# Patient Record
Sex: MALE | Race: WHITE
[De-identification: names, ages, dates, MRNs, and addresses within clinical notes are randomized per-mention and may not be internally consistent; named-entity substitution may affect disease eponyms.]

---

## 2018-05-28 ENCOUNTER — HOSPITAL ENCOUNTER (EMERGENCY)
Dept: HOSPITAL 7 - FB.ED | Age: 83
Discharge: HOME | End: 2018-05-28
Payer: MEDICARE

## 2018-05-28 DIAGNOSIS — I50.9: ICD-10-CM

## 2018-05-28 DIAGNOSIS — N18.9: ICD-10-CM

## 2018-05-28 DIAGNOSIS — L03.115: Primary | ICD-10-CM

## 2018-05-28 DIAGNOSIS — I73.9: ICD-10-CM

## 2018-05-28 PROCEDURE — 80053 COMPREHEN METABOLIC PANEL: CPT

## 2018-05-28 PROCEDURE — 86140 C-REACTIVE PROTEIN: CPT

## 2018-05-28 PROCEDURE — 16020 DRESS/DEBRID P-THICK BURN S: CPT

## 2018-05-28 PROCEDURE — 36415 COLL VENOUS BLD VENIPUNCTURE: CPT

## 2018-05-28 PROCEDURE — 99283 EMERGENCY DEPT VISIT LOW MDM: CPT

## 2018-05-28 PROCEDURE — 85025 COMPLETE CBC W/AUTO DIFF WBC: CPT

## 2018-05-28 PROCEDURE — 83880 ASSAY OF NATRIURETIC PEPTIDE: CPT

## 2018-05-29 ENCOUNTER — HOSPITAL ENCOUNTER (INPATIENT)
Dept: HOSPITAL 7 - FB.MS | Age: 83
LOS: 4 days | Discharge: HOME | DRG: 602 | End: 2018-06-02
Attending: FAMILY MEDICINE | Admitting: FAMILY MEDICINE
Payer: MEDICARE

## 2018-05-29 DIAGNOSIS — N40.0: ICD-10-CM

## 2018-05-29 DIAGNOSIS — L03.115: Primary | ICD-10-CM

## 2018-05-29 DIAGNOSIS — I73.9: ICD-10-CM

## 2018-05-29 DIAGNOSIS — I50.23: ICD-10-CM

## 2018-05-29 DIAGNOSIS — Z79.01: ICD-10-CM

## 2018-05-29 DIAGNOSIS — N18.3: ICD-10-CM

## 2018-05-29 DIAGNOSIS — H54.7: ICD-10-CM

## 2018-05-29 DIAGNOSIS — H91.90: ICD-10-CM

## 2018-05-29 DIAGNOSIS — I25.10: ICD-10-CM

## 2018-05-29 DIAGNOSIS — I13.0: ICD-10-CM

## 2018-05-29 DIAGNOSIS — Z86.73: ICD-10-CM

## 2018-05-29 DIAGNOSIS — Z79.82: ICD-10-CM

## 2018-05-29 DIAGNOSIS — G47.33: ICD-10-CM

## 2018-05-29 DIAGNOSIS — E78.5: ICD-10-CM

## 2018-05-29 RX ADMIN — Medication PRN ML: at 19:12

## 2018-05-29 RX ADMIN — Medication PRN ML: at 22:35

## 2018-05-29 RX ADMIN — Medication PRN ML: at 19:46

## 2018-05-29 RX ADMIN — Medication PRN ML: at 19:15

## 2018-05-29 NOTE — PCM.HP
H&P History of Present Illness





- General


Date of Service: 05/29/18


Admit Problem/Dx: 


 Admission Diagnosis/Problem





Admission Diagnosis/Problem      Cellulitis








Source of Information: Patient, Old Records


History Limitations: Reports: No Limitations





- History of Present Illness


Initial Comments - Free Text/Narative: 


Been ordered is an 83-year-old male that came into the clinic to see Dr. Escalante because of fever, chills and redness of both legs. I had seen him 

here at the hospital yesterday treated him for cellulitis, with oral Bactrim.I 

also increased his Lasix dose,due to CHF exacerbation, but his symptoms have 

worsened.


His right leg is worse than the left ,in terms or redness,and swelling. His 

symptoms started insidiously and have progressively gotten worse since 

Saturday. He has a history of severe peripheral vascular disease, CHF ,both 

poorly controlled & chronic kidney disease with a baseline creatinine of 1.5. 

He has stable hypertension. Of note he was recently hospitalized at [facility] because of left acute ischemic MCA stroke. He recovered fairly well 

and is currently on long-term anticoagulation -Eliquis.





- Related Data


Allergies/Adverse Reactions: 


 Allergies











Allergy/AdvReac Type Severity Reaction Status Date / Time


 


No Known Allergies Allergy   Verified 05/29/18 17:46











Home Medications: 


 Home Meds





Acetaminophen [Tylenol] 325 mg PO Q4H PRN 05/28/18 [History]


Apixaban [Eliquis] 2.5 mg PO BID 05/28/18 [History]


Aspirin 81 mg PO DAILY 05/28/18 [History]


Cholecalciferol (Vitamin D3) [Vitamin D3] 2,000 unit PO DAILY 05/28/18 [History]


Docusate Sodium [Colace] 100 mg PO DAILY 05/28/18 [History]


Finasteride [Proscar] 5 mg PO DAILY 05/28/18 [History]


Furosemide [Lasix] 40 mg PO DAILY 05/28/18 [History]


Metoprolol Succinate [Toprol XL] 25 mg PO DAILY 05/28/18 [History]


Multivit-Min/FA/Lycopene/Lut [Centrum Silver Tablet] 1 tab PO DAILY 05/28/18 [

History]


Tamsulosin [Flomax] 0.8 mg PO BEDTIME 05/28/18 [History]


atorvaSTATin [Lipitor] 40 mg PO DAILY 05/28/18 [History]











Past Medical History


HEENT History: Reports: Hard of Hearing, Impaired Vision


Respiratory History: Reports: SOB


Neurological History: Reports: CVA


Other Neuro History: April 22, 2018-stroke





Social & Family History





- Family History


Family Medical History: Unobtainable





- Tobacco Use


Smoking Status *Q: Never Smoker


Second Hand Smoke Exposure: No





- Caffeine Use


Caffeine Use: Reports: Coffee





- Recreational Drug Use


Recreational Drug Use: No





H&P Review of Systems





- Review of Systems:


Review Of Systems: ROS reveals no pertinent complaints other than HPI.





Exam





- Exam


Exam: See Below





- Vital Signs


Weight: 91.172 kg





- Exam


Quality Assessment: No: Supplemental Oxygen


General: Alert, Oriented, 4


HEENT: PERRLA, Hearing Intact, Mucosa Moist & Pink, Nares Patent, Normal Nasal 

Septum, Posterior Pharynx Clear, Conjunctiva Clear, EOMI, EACs Clear, TMs Clear


Neck: Supple, Trachea Midline, 2


Lungs: Crackles, Rales


Cardiovascular: Regular Rate, Regular Rhythm


GI/Abdominal Exam: Normal Bowel Sounds, Soft, Non-Tender, No Organomegaly, No 

Distention, No Abnormal Bruit, No Mass, Pelvis Stable


 (Male) Exam: Deferred


Rectal (Males) Exam: Deferred


Back Exam: Normal Inspection, Full Range of Motion, NT


Extremities: Pedal Edema, Increased Warmth, Mottled, Redness


Skin: Warm, Dry, Intact


Neurological: Cranial Nerves Intact, Reflexes Equal Bilateral


Neuro Extensive - Mental Status: Alert, Oriented x3, Normal Mood/Affect, Normal 

Cognition


Neuro Extensive - Motor, Sensory, Reflexes: CN II-XII Intact, Normal Gait, 

Normal Reflexes


Psychiatric: Alert, Normal Affect, Normal Mood





- Problem List


(1) Cellulitis


SNOMED Code(s): 504534331


   ICD Code: L03.90 - CELLULITIS, UNSPECIFIED   Status: Acute   Current Visit: 

Yes   


Qualifiers: 


   Site of cellulitis: extremity   Laterality: unspecified laterality 





(2) HTN (hypertension)


SNOMED Code(s): 67604441


   ICD Code: I10 - ESSENTIAL (PRIMARY) HYPERTENSION   Status: Acute   Current 

Visit: Yes   


Qualifiers: 


   Hypertension type: essential hypertension   Qualified Code(s): I10 - 

Essential (primary) hypertension   





(3) CHF (congestive heart failure)


SNOMED Code(s): 21041538


   ICD Code: I50.9 - HEART FAILURE, UNSPECIFIED   Status: Acute   Current Visit

: Yes   


Qualifiers: 


   Heart failure type: systolic 





(4) CKD (chronic kidney disease)


SNOMED Code(s): 467481214


   ICD Code: N18.9 - CHRONIC KIDNEY DISEASE, UNSPECIFIED   Status: Chronic   

Current Visit: Yes   


Qualifiers: 


   Chronic kidney disease stage: stage 3 (moderate)   Qualified Code(s): N18.3 

- Chronic kidney disease, stage 3 (moderate)   





(5) PAD (peripheral artery disease)


SNOMED Code(s): 245358260, 579373054


   ICD Code: I73.9 - PERIPHERAL VASCULAR DISEASE, UNSPECIFIED   Status: Chronic

   Current Visit: Yes   





(6) H/O: CVA (cerebrovascular accident)


SNOMED Code(s): 154052513


   ICD Code: Z86.73 - PRSNL HX OF TIA (TIA), AND CEREB INFRC W/O RESID DEFICITS

   Status: Acute   Current Visit: Yes   





(7) Afib


SNOMED Code(s): 27584961


   ICD Code: I48.91 - UNSPECIFIED ATRIAL FIBRILLATION   Status: Acute   Current 

Visit: Yes   


Qualifiers: 


   Atrial fibrillation type: persistent   Qualified Code(s): I48.1 - Persistent 

atrial fibrillation   





(8) CAD (coronary artery disease)


SNOMED Code(s): 78503363


   ICD Code: I25.10 - ATHSCL HEART DISEASE OF NATIVE CORONARY ARTERY W/O ANG 

PCTRS   Status: Chronic   Current Visit: Yes   


Qualifiers: 


   Coronary Disease-Associated Artery/Lesion type: bypass graft 





(9) HLD (hyperlipidemia)


SNOMED Code(s): 97519693


   ICD Code: E78.5 - HYPERLIPIDEMIA, UNSPECIFIED   Status: Chronic   Current 

Visit: Yes   


Qualifiers: 


   Hyperlipidemia type: unspecified   Qualified Code(s): E78.5 - Hyperlipidemia

, unspecified   





(10) ANNABELLA (obstructive sleep apnea)


SNOMED Code(s): 90235504


   ICD Code: G47.33 - OBSTRUCTIVE SLEEP APNEA (ADULT) (PEDIATRIC)   Status: 

Acute   Current Visit: Yes   





(11) Anticoagulant long-term use


SNOMED Code(s): 871072408


   ICD Code: Z79.01 - LONG TERM (CURRENT) USE OF ANTICOAGULANTS   Status: Acute

   Current Visit: Yes   





(12) BPH (benign prostatic hyperplasia)


SNOMED Code(s): 135534748


   ICD Code: N40.0 - BENIGN PROSTATIC HYPERPLASIA WITHOUT LOWER URINRY TRACT 

SYMP   Status: Acute   Current Visit: Yes   


Qualifiers: 


   Lower urinary tract symptom presence: symptoms present   Lower urinary tract 

symptom detail: unspecified   Qualified Code(s): N40.1 - Benign prostatic 

hyperplasia with lower urinary tract symptoms   


Problem List Initiated/Reviewed/Updated: Yes


Orders Last 24hrs: 


 Active Orders 24 hr











 Category Date Time Status


 


 Patient Status [ADT] Routine ADT  05/29/18 18:00 Ordered


 


 EKG Documentation Completion [RC] ASDIRECTED Care  05/29/18 18:02 Ordered


 


 Height and Weight [RC] DAILY Care  05/29/18 17:59 Ordered


 


 Intake and Output [RC] QSHIFT Care  05/29/18 18:00 Ordered


 


 Oxygen Therapy [RC] PRN Care  05/29/18 18:00 Ordered


 


 Up to Chair [RC] ASDIRECTED Care  05/29/18 17:59 Ordered


 


 VTE/DVT Education [RC] Per Unit Routine Care  05/29/18 18:00 Ordered


 


 Vital Signs [RC] Q4H Care  05/29/18 18:00 Ordered


 


 Heart Healthy Diet [DIET] Diet  05/29/18 Breakfast Ordered


 


 Chest 2V [CR] Stat Exams  05/29/18 17:59 Ordered


 


 BASIC METABOLIC PANEL,BMP [CHEM] AM Lab  05/30/18 05:11 Ordered


 


 BASIC METABOLIC PANEL,BMP [CHEM] Stat Lab  05/29/18 17:59 Ordered


 


 CBC WITH AUTO DIFF [HEME] AM Lab  05/30/18 05:11 Ordered


 


 CBC WITH AUTO DIFF [HEME] Stat Lab  05/29/18 17:59 Ordered


 


 CULTURE BLOOD [BC] Urgent Lab  05/29/18 18:02 Ordered


 


 CULTURE BLOOD [BC] Urgent Lab  05/29/18 18:02 Ordered


 


 PRO B-TYPE NATRIUR PEPT,BNPPRO [CHEM] DAILY Lab  05/30/18 05:11 Ordered


 


 PRO B-TYPE NATRIUR PEPT,BNPPRO [CHEM] DAILY Lab  05/31/18 05:11 Ordered


 


 PRO B-TYPE NATRIUR PEPT,BNPPRO [CHEM] DAILY Lab  06/01/18 05:11 Ordered


 


 Acetaminophen [Tylenol] Med  05/29/18 17:59 Ordered





 650 mg PO Q4H PRN   


 


 Ondansetron [Zofran ODT] Med  05/29/18 17:59 Ordered





 4 mg PO Q4H PRN   


 


 Sodium Chloride 0.9% [Saline Flush] Med  05/29/18 17:59 Ordered





 10 ml FLUSH ASDIRECTED PRN   


 


 Vancomycin 1,000 mg Med  05/29/18 18:00 Ordered





 Sodium Chloride 0.9% [Normal Saline] 250 ml   





 IV Q12H   


 


 ceFAZolin [Ancef] 2 gm Med  05/29/18 18:00 Ordered





 Premix Bag 1 bag   





 IV Q8H   


 


 Blood Culture x2 Reflex Set [OM.PC] Urgent Oth  05/29/18 17:59 Ordered


 


 Peripheral IV Insertion Adult [OM.PC] Routine Oth  05/29/18 17:59 Ordered


 


 Resuscitation Status Routine Resus Stat  05/29/18 17:59 Ordered


 


 EKG 12 Lead [EK] Stat Ther  05/29/18 17:59 Ordered








 Medication Orders





Acetaminophen (Tylenol)  650 mg PO Q4H PRN


   PRN Reason: Pain (Mild 1-3)/fever


Cefazolin Sodium/Dextrose 2 gm (/ Premix)  50 mls @ 100 mls/hr IV Q8H LD


Vancomycin HCl 1,000 mg/ (Sodium Chloride)  250 mls @ 167 mls/hr IV Q12H LD


Ondansetron HCl (Zofran Odt)  4 mg PO Q4H PRN


   PRN Reason: nausea, able to take PO


Sodium Chloride (Saline Flush)  10 ml FLUSH ASDIRECTED PRN


   PRN Reason: Keep Vein Open








Assessment/Plan Comment:: 


We'll admit Mr. Javed for IV antibiotic therapy. Before initiation, of 

ordered for 2 sets of blood cultures and a repeat blood count. I will start 

with Ancef and vancomycin(to be dosed by pharmacy) to cover for possibility of 

MRSA. Also give him IV diuresis,with Lasix. The rest of his home medications 

will be continued as appropriate.

## 2018-05-29 NOTE — ER
DATE SEEN:  05/28/2018

 

REASON FOR VISIT:  Swelling of the leg.

 

HISTORY OF PRESENT ILLNESS:  This is an 83-year-old male with swelling of the

right leg for the last week or so.  The swelling has been associated with

redness and weeping wounds.  This morning, he developed chills and felt hot.

 

REVIEW OF SYSTEMS:  He has chronic shortness of breath.  He denies headache or

chest pain.

 

PAST MEDICAL HISTORY:  CHF, he had a stroke in April of 2018, chronic renal

failure with a creatinine of 1.6 baseline, BPH, CAD, and peripheral vascular

disease.

 

ALLERGIES:  None.

 

MEDICATIONS:  Medications were printed from the [facility] [facility], and they are noted

in the electronic record.

 

PHYSICAL EXAMINATION:  GENERAL:  He is not in any cardiopulmonary distress.

VITAL SIGNS:  He has a normal temperature, pulse of 78, blood pressure is

normal, and oxygenation 91% on room air.  ENT:  Normal.  Neck:  No JVD.  CHEST:

End-expiratory rhonchi.  CARDIOVASCULAR:  Regularly irregular rhythm.  No

murmurs.  EXTREMITIES:  Mild-to-moderate peripheral edema.  The right leg has

redness, tenderness, and blisters and is slightly warm.

 

LABORATORIES:  White cell count 12.2.  CRP is 3.3.  Creatinine is 2.1.  BNP is

pending.

 

IMPRESSION:

1. Cellulitis, right lower extremity.

2. Congestive heart failure.

3. Peripheral vascular disease.

 

PLAN:

1. The patient is already on Eliquis and Lasix.  I increased the Lasix to 40

    mg b.i.d.

2. I started Bactrim DS 1 tablet b.i.d.

3. I recommended elevation of the legs, compression with wrapping, and a

    followup to be arranged with Dr. Escalante tomorrow.

 

TIME SEEN:  1450 hours.

 

Job#: [id number]

DD: 05/28/2018 1453

DT: 05/29/2018 0054 TN/MODL

## 2018-05-30 RX ADMIN — Medication PRN ML: at 03:01

## 2018-05-30 RX ADMIN — Medication PRN ML: at 14:18

## 2018-05-30 RX ADMIN — Medication PRN ML: at 22:49

## 2018-05-30 RX ADMIN — Medication PRN ML: at 09:50

## 2018-05-30 RX ADMIN — VANCOMYCIN HYDROCHLORIDE SCH MLS/HR: 500 INJECTION, POWDER, LYOPHILIZED, FOR SOLUTION INTRAVENOUS at 20:58

## 2018-05-30 RX ADMIN — Medication PRN ML: at 11:05

## 2018-05-30 RX ADMIN — Medication PRN ML: at 21:33

## 2018-05-30 RX ADMIN — Medication PRN ML: at 21:34

## 2018-05-30 RX ADMIN — VITAMIN D, TAB 1000IU (100/BT) SCH UNITS: 25 TAB at 09:36

## 2018-05-30 RX ADMIN — Medication PRN ML: at 21:08

## 2018-05-30 NOTE — PCM.PN
- General Info


Date of Service: 05/30/18


Subjective Update: 


Mr. Javed slept well overnight. He has no fever or chills neither does he 

have any chest pain. He is intermittently short of breath. The leg swelling and 

redness persists.





- Review of Systems


HEENT: Reports: No Symptoms


Gastrointestinal: Reports: No Symptoms


Genitourinary: Reports: No Symptoms





- Patient Data


Vitals - Most Recent: 


 Last Vital Signs











Temp  97.6 F   05/30/18 02:00


 


Pulse  52 L  05/30/18 02:00


 


Resp  18   05/30/18 02:00


 


BP  127/68   05/30/18 02:00


 


Pulse Ox  96   05/30/18 02:00











Weight - Most Recent: 90.718 kg


I&O - Last 24 Hours: 


 Intake & Output











 05/29/18 05/30/18 05/30/18





 22:59 06:59 14:59


 


Intake Total 700 200 


 


Output Total  450 


 


Balance 700 -250 











Lab Results Last 24 Hours: 


 Laboratory Results - last 24 hr











  05/29/18 05/29/18 05/30/18 Range/Units





  18:25 18:25 06:34 


 


WBC  11.8   10.7  (4.5-12.0)  X10-3/uL


 


RBC  4.47   4.40  (4.30-5.75)  x10(6)uL


 


Hgb  14.6   14.4  (11.5-15.5)  g/dL


 


Hct  43.3   43.4  (30.0-51.3)  %


 


MCV  96.8 H   98.7 H  (80-96)  fL


 


MCH  32.6   32.8  (27.7-33.6)  pg


 


MCHC  33.6   33.2  (32.2-35.4)  g/dL


 


RDW  14.0   14.1  (11.5-15.5)  %


 


Plt Count  88 L   90 L  (125-369)  X10(3)uL


 


MPV  11.0 H   11.3 H  (7.4-10.4)  fL


 


Neut % (Auto)  84.8 H    (46-82)  %


 


Lymph % (Auto)  5.7 L    (13-37)  %


 


Mono % (Auto)  8.8    (4-12)  %


 


Eos % (Auto)  0 L    (1.0-5.0)  %


 


Baso % (Auto)  0    (0-2)  %


 


Neut # (Auto)  10.1 H    (1.6-8.3)  #


 


Lymph # (Auto)  0.7    (0.6-5.0)  #


 


Mono # (Auto)  1.0    (0.0-1.3)  #


 


Eos # (Auto)  0.0    (0.0-0.8)  #


 


Baso # (Auto)  0.0    (0.0-0.2)  #


 


Add Manual Diff    Yes  


 


Neutrophils % (Manual)    89 H  (46-82)  %


 


Lymphocytes % (Manual)    5 L  (13-37)  %


 


Monocytes % (Manual)    6  (4-12)  %


 


Polychromasia      


 


Sodium   139   (135-145)  mmol/L


 


Potassium   3.5   (3.5-5.3)  mmol/L


 


Chloride   100   (100-110)  mmol/L


 


Carbon Dioxide   29   (21-32)  mmol/L


 


BUN   50 H   (7-18)  mg/dL


 


Creatinine   2.2 H*   (0.70-1.30)  mg/dL


 


Est Cr Clr Drug Dosing   30.10   mL/min


 


Estimated GFR (MDRD)   29 L   (>60)  


 


BUN/Creatinine Ratio   22.7 H   (9-20)  


 


Glucose   108   ()  mg/dL


 


Calcium   9.4   (8.6-10.2)  mg/dL


 


NT-Pro-B Natriuret Pep     (<=450)  pg/mL














  05/30/18 05/30/18 Range/Units





  06:34 06:34 


 


WBC    (4.5-12.0)  X10-3/uL


 


RBC    (4.30-5.75)  x10(6)uL


 


Hgb    (11.5-15.5)  g/dL


 


Hct    (30.0-51.3)  %


 


MCV    (80-96)  fL


 


MCH    (27.7-33.6)  pg


 


MCHC    (32.2-35.4)  g/dL


 


RDW    (11.5-15.5)  %


 


Plt Count    (125-369)  X10(3)uL


 


MPV    (7.4-10.4)  fL


 


Neut % (Auto)    (46-82)  %


 


Lymph % (Auto)    (13-37)  %


 


Mono % (Auto)    (4-12)  %


 


Eos % (Auto)    (1.0-5.0)  %


 


Baso % (Auto)    (0-2)  %


 


Neut # (Auto)    (1.6-8.3)  #


 


Lymph # (Auto)    (0.6-5.0)  #


 


Mono # (Auto)    (0.0-1.3)  #


 


Eos # (Auto)    (0.0-0.8)  #


 


Baso # (Auto)    (0.0-0.2)  #


 


Add Manual Diff    


 


Neutrophils % (Manual)    (46-82)  %


 


Lymphocytes % (Manual)    (13-37)  %


 


Monocytes % (Manual)    (4-12)  %


 


Polychromasia    


 


Sodium  140   (135-145)  mmol/L


 


Potassium  3.5   (3.5-5.3)  mmol/L


 


Chloride  101   (100-110)  mmol/L


 


Carbon Dioxide  29   (21-32)  mmol/L


 


BUN  50 H   (7-18)  mg/dL


 


Creatinine  2.3 H*   (0.70-1.30)  mg/dL


 


Est Cr Clr Drug Dosing  28.79   mL/min


 


Estimated GFR (MDRD)  27 L   (>60)  


 


BUN/Creatinine Ratio  21.7 H   (9-20)  


 


Glucose  100   ()  mg/dL


 


Calcium  9.1   (8.6-10.2)  mg/dL


 


NT-Pro-B Natriuret Pep   40945 H*  (<=450)  pg/mL











Med Orders - Current: 


 Current Medications





Acetaminophen (Tylenol)  650 mg PO Q4H PRN


   PRN Reason: Pain (Mild 1-3)/fever


   Last Admin: 05/29/18 20:55 Dose:  650 mg


Apixaban (Eliquis)  2.5 mg PO BID LD


   Last Admin: 05/29/18 22:28 Dose:  2.5 mg


Furosemide (Lasix)  40 mg IVPUSH BIDDIURETIC [facility]


   Last Admin: 05/29/18 19:08 Dose:  40 mg


Cefazolin Sodium/Dextrose 2 gm (/ Premix)  50 mls @ 100 mls/hr IV Q8H [facility]


   Last Admin: 05/30/18 02:28 Dose:  100 mls/hr


Vancomycin HCl 1,000 mg/ (Sodium Chloride)  250 mls @ 167 mls/hr IV Q12H [facility]


Vancomycin HCl 1,000 mg/Vancomycin HCl 500 mg/ Sodium Chloride  500 mls @ 

333.333 mls/hr IV Q36H [facility]


Metolazone (Zaroxolyn)  5 mg PO ONETIME ONE


   Stop: 05/30/18 08:07


Ondansetron HCl (Zofran Odt)  4 mg PO Q4H PRN


   PRN Reason: nausea, able to take PO


Sodium Chloride (Saline Flush)  10 ml FLUSH ASDIRECTED PRN


   PRN Reason: Keep Vein Open


   Last Admin: 05/30/18 03:01 Dose:  10 ml


Tamsulosin HCl (Flomax)  0.8 mg PO BEDTIME LD





Discontinued Medications





Vancomycin HCl 1,000 mg/Vancomycin HCl 500 mg/ Sodium Chloride  500 mls @ 

333.333 mls/hr IV ONETIME ONE


   Stop: 05/29/18 20:29


   Last Admin: 05/29/18 20:45 Dose:  333.333 mls/hr











- Exam


Quality Assessment: No: Supplemental Oxygen


General: Alert, Oriented


HEENT: Pupils Equal


Lungs: Clear to Auscultation, Rales


Cardiovascular: Irregular Rhythm


GI/Abdominal Exam: Normal Bowel Sounds, Soft, Non-Tender, No Organomegaly, No 

Distention, No Abnormal Bruit, No Mass, Pelvis Stable


Extremities: Pedal Edema, Slow Capillary Refill, Leg Pain, Mottled, Pallor, 

Redness


Psy/Mental Status: Alert, Normal Affect, Normal Mood





- Problem List & Annotations


(1) Cellulitis


SNOMED Code(s): 052453492


   Code(s): L03.90 - CELLULITIS, UNSPECIFIED   Status: Acute   Current Visit: 

Yes   


Qualifiers: 


   Site of cellulitis: extremity   Laterality: unspecified laterality 





(2) HTN (hypertension)


SNOMED Code(s): 26848161


   Code(s): I10 - ESSENTIAL (PRIMARY) HYPERTENSION   Status: Acute   Current 

Visit: Yes   


Qualifiers: 


   Hypertension type: essential hypertension   Qualified Code(s): I10 - 

Essential (primary) hypertension   





(3) CHF (congestive heart failure)


SNOMED Code(s): 95210953


   Code(s): I50.9 - HEART FAILURE, UNSPECIFIED   Status: Acute   Current Visit: 

Yes   


Qualifiers: 


   Heart failure type: systolic 





(4) CKD (chronic kidney disease)


SNOMED Code(s): 980359275


   Code(s): N18.9 - CHRONIC KIDNEY DISEASE, UNSPECIFIED   Status: Chronic   

Current Visit: Yes   


Qualifiers: 


   Chronic kidney disease stage: stage 3 (moderate)   Qualified Code(s): N18.3 

- Chronic kidney disease, stage 3 (moderate)   





(5) PAD (peripheral artery disease)


SNOMED Code(s): 999950608, 548133000


   Code(s): I73.9 - PERIPHERAL VASCULAR DISEASE, UNSPECIFIED   Status: Chronic 

  Current Visit: Yes   





(6) H/O: CVA (cerebrovascular accident)


SNOMED Code(s): 020749075


   Code(s): Z86.73 - PRSNL HX OF TIA (TIA), AND CEREB INFRC W/O RESID DEFICITS 

  Status: Acute   Current Visit: Yes   





(7) Afib


SNOMED Code(s): 71025511


   Code(s): I48.91 - UNSPECIFIED ATRIAL FIBRILLATION   Status: Acute   Current 

Visit: Yes   


Qualifiers: 


   Atrial fibrillation type: persistent   Qualified Code(s): I48.1 - Persistent 

atrial fibrillation   





(8) CAD (coronary artery disease)


SNOMED Code(s): 39303969


   Code(s): I25.10 - ATHSCL HEART DISEASE OF NATIVE CORONARY ARTERY W/O ANG 

PCTRS   Status: Chronic   Current Visit: Yes   


Qualifiers: 


   Coronary Disease-Associated Artery/Lesion type: bypass graft 





(9) HLD (hyperlipidemia)


SNOMED Code(s): 71281996


   Code(s): E78.5 - HYPERLIPIDEMIA, UNSPECIFIED   Status: Chronic   Current 

Visit: Yes   


Qualifiers: 


   Hyperlipidemia type: unspecified   Qualified Code(s): E78.5 - Hyperlipidemia

, unspecified   





(10) ANNABELLA (obstructive sleep apnea)


SNOMED Code(s): 88334649


   Code(s): G47.33 - OBSTRUCTIVE SLEEP APNEA (ADULT) (PEDIATRIC)   Status: 

Acute   Current Visit: Yes   





(11) Anticoagulant long-term use


SNOMED Code(s): 405874321


   Code(s): Z79.01 - LONG TERM (CURRENT) USE OF ANTICOAGULANTS   Status: Acute 

  Current Visit: Yes   





(12) BPH (benign prostatic hyperplasia)


SNOMED Code(s): 339185963


   Code(s): N40.0 - BENIGN PROSTATIC HYPERPLASIA WITHOUT LOWER URINRY TRACT 

SYMP   Status: Acute   Current Visit: Yes   


Qualifiers: 


   Lower urinary tract symptom presence: symptoms present   Lower urinary tract 

symptom detail: unspecified   Qualified Code(s): N40.1 - Benign prostatic 

hyperplasia with lower urinary tract symptoms   





- Problem List Review


Problem List Initiated/Reviewed/Updated: Yes





- My Orders


Last 24 Hours: 


My Active Orders





05/29/18 17:59


Height and Weight [RC] 07 


Up to Chair [RC] 09,13,17,21 


Chest 2V [CR] Stat 


Acetaminophen [Tylenol]   650 mg PO Q4H PRN 


Ondansetron [Zofran ODT]   4 mg PO Q4H PRN 


Sodium Chloride 0.9% [Saline Flush]   10 ml FLUSH ASDIRECTED PRN 


Blood Culture x2 Reflex Set [OM.PC] Urgent 


Peripheral IV Insertion Adult [OM.PC] Routine 


Resuscitation Status Routine 


EKG 12 Lead [EK] Stat 





05/29/18 18:00


Patient Status [ADT] Routine 


Intake and Output [RC] 06,14,22 


Oxygen Therapy [RC] PRN 


VTE/DVT Education [RC] Per Unit Routine 


Vital Signs [RC] 04,08,12,16,20,00 


Vancomycin 1,000 mg   Sodium Chloride 0.9% [Normal Saline] 250 ml IV Q12H 


ceFAZolin [Ancef] 2 gm   Premix Bag 1 bag IV Q8H 





05/29/18 18:02


EKG Documentation Completion [RC] ASDIRECTED 





05/29/18 18:25


CULTURE BLOOD [BC] Urgent 





05/29/18 18:30


Furosemide [Lasix]   40 mg IVPUSH BIDDIURETIC 





05/29/18 18:35


CULTURE BLOOD [BC] Urgent 





05/29/18 21:45


Apixaban [Eliquis]   2.5 mg PO BID 





05/30/18 08:06


Metolazone [Zaroxolyn]   5 mg PO ONETIME ONE 





05/30/18 21:00


Tamsulosin [Flomax]   0.8 mg PO BEDTIME 





05/31/18 05:11


BASIC METABOLIC PANEL,BMP [CHEM] AM 


CBC WITH AUTO DIFF [HEME] AM 


PRO B-TYPE NATRIUR PEPT,BNPPRO [CHEM] DAILY 





05/31/18 07:00


Vancomycin 1,000 mg Vancomycin 500 mg   Sodium Chloride 0.9% [Normal Saline] 

500 ml IV Q36H 





06/01/18 05:11


PRO B-TYPE NATRIUR PEPT,BNPPRO [CHEM] DAILY 














- Plan


Plan:: 


BNP is markedly elevated. I will continued IV Lasix and add one-time dose of 

Zaroxolyn. His Echo from April showes global hypokinesis and EF of 20%.I'll 

continue this current treatment of vancomycin dosed by pharmacy, and Ancef 

every 8 hours. I did recommend elevation of the extremities, and will plan to 

follow basic profile CBC and BNP in the morning.

## 2018-05-30 NOTE — CR
INDICATION:  Short of breath.



CHEST:  PA and lateral views of the chest were obtained 5-29-18.  No comparison 
was available.



Bibasilar pleural parenchymal changes are noted likely representing pneumonia 
and pleuritis.  However the upper lung pulmonary vasculature is somewhat 
prominent and the heart is enlarged with mitral valve replacement-median 
sternotomy.  The possibility of CHF is also a consideration.



Infiltration also appears to be minimal in the right upper lobe with pleuritis, 
there should be some thickening of the minor fissure.



Bridging hyperostotic changes noted in the mid to lower thoracic spine.



Hyperaeration, prominent PA diameter, and flattened diaphragm leaves suggest 
COPD.



IMPRESSION:  

1.   ASHD, cardiomegaly, mitral valve replacement with possible CHF.

2.   Bibasilar pleural parenchymal changes made on the basis of pneumonia and 
pleuritis-correlate clinically.

3.   Probable COPD.

4.   DJD spine.

5.   Probable minimal patchy pneumonia and pleuritis in the right upper lobe.





MTDD

## 2018-05-31 RX ADMIN — Medication PRN ML: at 22:26

## 2018-05-31 RX ADMIN — POTASSIUM CHLORIDE SCH MEQ: 1500 TABLET, EXTENDED RELEASE ORAL at 10:41

## 2018-05-31 RX ADMIN — Medication PRN ML: at 22:06

## 2018-05-31 RX ADMIN — VANCOMYCIN HYDROCHLORIDE SCH MLS/HR: 500 INJECTION, POWDER, LYOPHILIZED, FOR SOLUTION INTRAVENOUS at 20:29

## 2018-05-31 RX ADMIN — VITAMIN D, TAB 1000IU (100/BT) SCH UNITS: 25 TAB at 08:18

## 2018-05-31 RX ADMIN — Medication PRN ML: at 08:22

## 2018-05-31 RX ADMIN — Medication PRN ML: at 10:30

## 2018-05-31 NOTE — PCM.PN
- General Info


Date of Service: 05/31/18


Subjective Update: 


Then has had significant diuresis. Pain and swelling of both legs is improved. 

Shortness of breath has also improved. He has normal fever chills





- Review of Systems


HEENT: Reports: No Symptoms


Pulmonary: Reports: Cough


Cardiovascular: Reports: Dyspnea on Exertion


Gastrointestinal: Reports: No Symptoms


Genitourinary: Reports: No Symptoms





- Patient Data


Vitals - Most Recent: 


 Last Vital Signs











Temp  98 F   05/31/18 08:00


 


Pulse  80   05/31/18 08:22


 


Resp  18   05/31/18 08:00


 


BP  122/46 L  05/31/18 08:22


 


Pulse Ox  99   05/31/18 08:00











Weight - Most Recent: 89.414 kg


I&O - Last 24 Hours: 


 Intake & Output











 05/30/18 05/31/18 05/31/18





 22:59 06:59 14:59


 


Intake Total 450 400 


 


Output Total 1150 850 


 


Balance -700 -450 











Lab Results Last 24 Hours: 


 Laboratory Results - last 24 hr











  05/31/18 05/31/18 05/31/18 Range/Units





  06:00 06:00 06:00 


 


WBC   7.1   (4.5-12.0)  X10-3/uL


 


RBC   4.08 L   (4.30-5.75)  x10(6)uL


 


Hgb   12.9   (11.5-15.5)  g/dL


 


Hct   39.7   (30.0-51.3)  %


 


MCV   97.2 H   (80-96)  fL


 


MCH   31.6   (27.7-33.6)  pg


 


MCHC   32.5   (32.2-35.4)  g/dL


 


RDW   13.9   (11.5-15.5)  %


 


Plt Count   89 L   (125-369)  X10(3)uL


 


MPV   10.9 H   (7.4-10.4)  fL


 


Neut % (Auto)   80.5   (46-82)  %


 


Lymph % (Auto)   7.7 L   (13-37)  %


 


Mono % (Auto)   9.2   (4-12)  %


 


Eos % (Auto)   2   (1.0-5.0)  %


 


Baso % (Auto)   0   (0-2)  %


 


Neut # (Auto)   5.7   (1.6-8.3)  #


 


Lymph # (Auto)   0.5 L   (0.6-5.0)  #


 


Mono # (Auto)   0.7   (0.0-1.3)  #


 


Eos # (Auto)   0.2   (0.0-0.8)  #


 


Baso # (Auto)   0.0   (0.0-0.2)  #


 


Sodium    139  (135-145)  mmol/L


 


Potassium    3.2 L  (3.5-5.3)  mmol/L


 


Chloride    101  (100-110)  mmol/L


 


Carbon Dioxide    32  (21-32)  mmol/L


 


BUN    49 H  (7-18)  mg/dL


 


Creatinine    2.2 H*  (0.70-1.30)  mg/dL


 


Est Cr Clr Drug Dosing    30.10  mL/min


 


Estimated GFR (MDRD)    29 L  (>60)  


 


BUN/Creatinine Ratio    22.3 H  (9-20)  


 


Glucose    107  ()  mg/dL


 


Calcium    9.1  (8.6-10.2)  mg/dL


 


NT-Pro-B Natriuret Pep  66721 H*    (<=450)  pg/mL











Isaac Results Last 24 Hours: 


 Microbiology











 05/29/18 18:35 Aerobic Blood Culture - Preliminary





 Blood - Venous - Lab Draw    NO GROWTH AFTER 1 DAY





 Anaerobic Blood Culture - Preliminary





    NO GROWTH AFTER 1 DAY


 


 05/29/18 18:25 Aerobic Blood Culture - Preliminary





 Blood - Venous    NO GROWTH AFTER 1 DAY





 Anaerobic Blood Culture - Preliminary





    NO GROWTH AFTER 1 DAY











Med Orders - Current: 


 Current Medications





Acetaminophen (Tylenol)  325 mg PO Q4H PRN


   PRN Reason: Pain


   Last Admin: 05/30/18 16:54 Dose:  325 mg


Apixaban (Eliquis)  2.5 mg PO BID [facility]


   Last Admin: 05/31/18 08:17 Dose:  2.5 mg


Aspirin (Aspirin)  81 mg PO DAILY [facility]


   Last Admin: 05/31/18 08:17 Dose:  81 mg


Cefazolin Sodium (Ancef)  1 gm IVPUSH Q12H [facility]


   Last Admin: 05/30/18 21:21 Dose:  1 gm


Cholecalciferol (Vitamin D3)  2,000 units PO DAILY [facility]


   Last Admin: 05/31/18 08:18 Dose:  2,000 units


Docusate Sodium (Colace)  100 mg PO DAILY [facility]


   Last Admin: 05/31/18 08:18 Dose:  100 mg


Finasteride (Proscar)  5 mg PO DAILY [facility]


   Last Admin: 05/31/18 08:18 Dose:  5 mg


Furosemide (Lasix)  20 mg PO BIDDIURETIC [facility]


Vancomycin HCl 1,000 mg/ (Sodium Chloride)  250 mls @ 167 mls/hr IV Q12H [facility]


Vancomycin HCl 500 mg/Vancomycin HCl 750 mg/ Sodium Chloride  250 mls @ 167 mls/

hr IV Q24H [facility]


   Last Admin: 05/30/18 20:58 Dose:  167 mls/hr


Metoprolol Succinate (Toprol Xl)  25 mg PO DAILY [facility]


   Last Admin: 05/31/18 08:22 Dose:  25 mg


Ondansetron HCl (Zofran Odt)  4 mg PO Q4H PRN


   PRN Reason: nausea, able to take PO


Potassium Chloride (Klor-Con M20)  20 meq PO DAILY [facility]


Sodium Chloride (Saline Flush)  10 ml FLUSH ASDIRECTED PRN


   PRN Reason: Keep Vein Open


   Last Admin: 05/31/18 08:22 Dose:  10 ml


Tamsulosin HCl (Flomax)  0.8 mg PO BEDTIME [facility]


   Last Admin: 05/30/18 20:48 Dose:  0.8 mg





Discontinued Medications





Acetaminophen (Tylenol)  650 mg PO Q4H PRN


   PRN Reason: Pain (Mild 1-3)/fever


   Last Admin: 05/29/18 20:55 Dose:  650 mg


Apixaban (Eliquis)  2.5 mg PO BID [facility]


   Last Admin: 05/29/18 22:28 Dose:  2.5 mg


Furosemide (Lasix)  40 mg IVPUSH BIDDIURETIC [facility]


   Last Admin: 05/31/18 08:17 Dose:  40 mg


Cefazolin Sodium/Dextrose 2 gm (/ Premix)  50 mls @ 100 mls/hr IV Q8H [facility]


   Last Admin: 05/30/18 09:49 Dose:  100 mls/hr


Vancomycin HCl 1,000 mg/Vancomycin HCl 500 mg/ Sodium Chloride  500 mls @ 

333.333 mls/hr IV ONETIME ONE


   Stop: 05/29/18 20:29


   Last Admin: 05/29/18 20:45 Dose:  333.333 mls/hr


Metolazone (Zaroxolyn)  5 mg PO ONETIME ONE


   Stop: 05/30/18 08:07


   Last Admin: 05/30/18 09:35 Dose:  5 mg











- Exam


Quality Assessment: No: Supplemental Oxygen


General: Alert, Oriented


HEENT: Pupils Equal


Lungs: Clear to Auscultation


Cardiovascular: Regular Rate, Irregular Rhythm


Extremities: Pedal Edema, Mottled, Pallor, Redness.  No: Increased Warmth





- Problem List & Annotations


(1) Cellulitis


SNOMED Code(s): 813773075


   Code(s): L03.90 - CELLULITIS, UNSPECIFIED   Status: Acute   Current Visit: 

Yes   


Qualifiers: 


   Site of cellulitis: extremity   Laterality: unspecified laterality 





(2) HTN (hypertension)


SNOMED Code(s): 13695679


   Code(s): I10 - ESSENTIAL (PRIMARY) HYPERTENSION   Status: Acute   Current 

Visit: Yes   


Qualifiers: 


   Hypertension type: essential hypertension   Qualified Code(s): I10 - 

Essential (primary) hypertension   





(3) CHF (congestive heart failure)


SNOMED Code(s): 52231028


   Code(s): I50.9 - HEART FAILURE, UNSPECIFIED   Status: Acute   Current Visit: 

Yes   


Qualifiers: 


   Heart failure type: systolic 





(4) CKD (chronic kidney disease)


SNOMED Code(s): 490333086


   Code(s): N18.9 - CHRONIC KIDNEY DISEASE, UNSPECIFIED   Status: Chronic   

Current Visit: Yes   


Qualifiers: 


   Chronic kidney disease stage: stage 3 (moderate)   Qualified Code(s): N18.3 

- Chronic kidney disease, stage 3 (moderate)   





(5) PAD (peripheral artery disease)


SNOMED Code(s): 254653134, 690724586


   Code(s): I73.9 - PERIPHERAL VASCULAR DISEASE, UNSPECIFIED   Status: Chronic 

  Current Visit: Yes   





(6) H/O: CVA (cerebrovascular accident)


SNOMED Code(s): 852509260


   Code(s): Z86.73 - PRSNL HX OF TIA (TIA), AND CEREB INFRC W/O RESID DEFICITS 

  Status: Acute   Current Visit: Yes   





(7) Afib


SNOMED Code(s): 25853092


   Code(s): I48.91 - UNSPECIFIED ATRIAL FIBRILLATION   Status: Acute   Current 

Visit: Yes   


Qualifiers: 


   Atrial fibrillation type: persistent   Qualified Code(s): I48.1 - Persistent 

atrial fibrillation   





(8) CAD (coronary artery disease)


SNOMED Code(s): 63704294


   Code(s): I25.10 - ATHSCL HEART DISEASE OF NATIVE CORONARY ARTERY W/O ANG 

PCTRS   Status: Chronic   Current Visit: Yes   


Qualifiers: 


   Coronary Disease-Associated Artery/Lesion type: bypass graft 





(9) HLD (hyperlipidemia)


SNOMED Code(s): 18163562


   Code(s): E78.5 - HYPERLIPIDEMIA, UNSPECIFIED   Status: Chronic   Current 

Visit: Yes   


Qualifiers: 


   Hyperlipidemia type: unspecified   Qualified Code(s): E78.5 - Hyperlipidemia

, unspecified   





(10) ANNABELLA (obstructive sleep apnea)


SNOMED Code(s): 61939958


   Code(s): G47.33 - OBSTRUCTIVE SLEEP APNEA (ADULT) (PEDIATRIC)   Status: 

Acute   Current Visit: Yes   





(11) Anticoagulant long-term use


SNOMED Code(s): 270303121


   Code(s): Z79.01 - LONG TERM (CURRENT) USE OF ANTICOAGULANTS   Status: Acute 

  Current Visit: Yes   





(12) BPH (benign prostatic hyperplasia)


SNOMED Code(s): 757831192


   Code(s): N40.0 - BENIGN PROSTATIC HYPERPLASIA WITHOUT LOWER URINRY TRACT 

SYMP   Status: Acute   Current Visit: Yes   


Qualifiers: 


   Lower urinary tract symptom presence: symptoms present   Lower urinary tract 

symptom detail: unspecified   Qualified Code(s): N40.1 - Benign prostatic 

hyperplasia with lower urinary tract symptoms   





- Problem List Review


Problem List Initiated/Reviewed/Updated: Yes





- My Orders


Last 24 Hours: 


My Active Orders





05/30/18 08:22


Acetaminophen [Tylenol]   325 mg PO Q4H PRN 





05/30/18 09:00


Apixaban [Eliquis]   2.5 mg PO BID 


Aspirin   81 mg PO DAILY 


Cholecalciferol (Vitamin D3) [Vitamin D3]   2,000 units PO DAILY 


Docusate Sodium [Colace]   100 mg PO DAILY 


Finasteride [Proscar]   5 mg PO DAILY 


Metoprolol Succinate [Toprol XL]   25 mg PO DAILY 





05/30/18 21:00


Tamsulosin [Flomax]   0.8 mg PO BEDTIME 


Vancomycin 500 mg Vancomycin 750 mg   Sodium Chloride 0.9% [Normal Saline] 250 

ml IV Q24H 





05/30/18 22:00


ceFAZolin [Ancef]   1 gm IVPUSH Q12H 





05/31/18 09:00


Potassium Chloride [Klor-Con M20]   20 meq PO DAILY 





05/31/18 14:00


Furosemide [Lasix]   20 mg PO BIDDIURETIC 





06/01/18 05:11


CBC WITH AUTO DIFF [HEME] AM 


COMPREHENSIVE METABOLIC PN,CMP [CHEM] AM 


PRO B-TYPE NATRIUR PEPT,BNPPRO [CHEM] DAILY 





06/01/18 20:30


VANCOMYCIN TROUGH [CHEM] Timed 














- Plan


Plan:: 


Benadryl showed some improvement of his cellulitis.  Edema of the legs has 

improved. He still has some blisters and redness, plus he has peripheral 

vascular disease that is severe,complicating the clinical picture.. He's atrial 

fibrillation stable is currently on anticoagulation. His creatinine has crept 

upwards to 2.2 from a baseline of 1.6 I believe this is probably due to the 

diuresis. I will reduce the dose of Lasix to 20 twice a day. I'll continue with 

IV vancomycin and Ancef. Continue with local wound care as well,and 

elevation.Repeat AM labs

## 2018-06-01 RX ADMIN — Medication PRN ML: at 10:24

## 2018-06-01 RX ADMIN — POTASSIUM CHLORIDE SCH MEQ: 1500 TABLET, EXTENDED RELEASE ORAL at 09:08

## 2018-06-01 RX ADMIN — VITAMIN D, TAB 1000IU (100/BT) SCH UNITS: 25 TAB at 09:09

## 2018-06-01 NOTE — PCM.PN
- General Info


Date of Service: 06/01/18


Subjective Update: 


Patient is an 83-year-old male currently on hospital day #4 for right lower 

extremity cellulitis. Patient is feeling fairly well. Pain in the right lower 

extremity is much improved. He's been getting vancomycin and Ancef IV. He's 

having no nausea, no vomiting, no diarrhea. The patient had about 2-3 weeks of 

increasing redness and warmth in the right lower extremity with some blistering 

and weeping. On Memorial Day morning he started to develop fevers and chills 

and presented to the emergency department. He was started on oral Bactrim, took 

1 dose that evening and one dose the next morning and presented to the clinic 

for follow-up from his ER visit. He was no better at that time and in looking 

at the wound the doctor who saw him in the clinic felt he should be admitted so 

he was admitted for IV antibiotic management. He's done well throughout his 

hospitalization.








- Patient Data


Vitals - Most Recent: 


 Last Vital Signs











Temp  36.5 C   06/01/18 13:00


 


Pulse  79   06/01/18 15:48


 


Resp  20   06/01/18 15:48


 


BP  115/54 L  06/01/18 15:48


 


Pulse Ox  96   06/01/18 15:48











Weight - Most Recent: 88.564 kg


I&O - Last 24 Hours: 


 Intake & Output











 06/01/18 06/01/18 06/01/18





 06:59 14:59 22:59


 


Intake Total 600  


 


Output Total 925  


 


Balance -325  











Lab Results Last 24 Hours: 


 Laboratory Results - last 24 hr











  06/01/18 06/01/18 06/01/18 Range/Units





  06:55 06:55 06:55 


 


WBC   5.9   (4.5-12.0)  X10-3/uL


 


RBC   4.01 L   (4.30-5.75)  x10(6)uL


 


Hgb   13.2   (11.5-15.5)  g/dL


 


Hct   39.1   (30.0-51.3)  %


 


MCV   97.6 H   (80-96)  fL


 


MCH   32.8   (27.7-33.6)  pg


 


MCHC   33.6   (32.2-35.4)  g/dL


 


RDW   13.8   (11.5-15.5)  %


 


Plt Count   85 L   (125-369)  X10(3)uL


 


MPV   10.8 H   (7.4-10.4)  fL


 


Add Manual Diff   Yes   


 


Neutrophils % (Manual)   77   (46-82)  %


 


Band Neutrophils %   2   (0-6)  %


 


Lymphocytes % (Manual)   13   (13-37)  %


 


Monocytes % (Manual)   6   (4-12)  %


 


Eosinophils % (Manual)   2   (0-5)  %


 


Sodium    139  (135-145)  mmol/L


 


Potassium    3.3 L  (3.5-5.3)  mmol/L


 


Chloride    102  (100-110)  mmol/L


 


Carbon Dioxide    30  (21-32)  mmol/L


 


BUN    50 H  (7-18)  mg/dL


 


Creatinine    1.9 H  (0.70-1.30)  mg/dL


 


Est Cr Clr Drug Dosing    34.85  mL/min


 


Estimated GFR (MDRD)    34 L  (>60)  


 


BUN/Creatinine Ratio    26.3 H  (9-20)  


 


Glucose    113  ()  mg/dL


 


Calcium    8.9  (8.6-10.2)  mg/dL


 


Total Bilirubin    1.2  (0.1-1.3)  mg/dL


 


AST    21  D  (5-25)  IU/L


 


ALT    14  D  (12-36)  U/L


 


Alkaline Phosphatase    89  ()  IU/L


 


NT-Pro-B Natriuret Pep  07876 H*    (<=450)  pg/mL


 


Total Protein    6.3  (6.0-8.0)  g/dL


 


Albumin    2.9 L  (3.2-4.6)  g/dL


 


Globulin    3.4  g/dL


 


Albumin/Globulin Ratio    0.9  











Isaac Results Last 24 Hours: 


 Microbiology











 05/29/18 18:35 Aerobic Blood Culture - Preliminary





 Blood - Venous - Lab Draw    NO GROWTH AFTER 2 DAYS





 Anaerobic Blood Culture - Preliminary





    NO GROWTH AFTER 2 DAYS


 


 05/29/18 18:25 Aerobic Blood Culture - Preliminary





 Blood - Venous    NO GROWTH AFTER 2 DAYS





 Anaerobic Blood Culture - Preliminary





    NO GROWTH AFTER 2 DAYS











Med Orders - Current: 


 Current Medications





Acetaminophen (Tylenol)  325 mg PO Q4H PRN


   PRN Reason: Pain


   Last Admin: 06/01/18 03:21 Dose:  325 mg


Apixaban (Eliquis)  2.5 mg PO BID [facility]


   Last Admin: 06/01/18 09:08 Dose:  2.5 mg


Aspirin (Aspirin)  81 mg PO DAILY [facility]


   Last Admin: 06/01/18 09:08 Dose:  81 mg


Cephalexin (Keflex)  500 mg PO TID [facility]


Cholecalciferol (Vitamin D3)  2,000 units PO DAILY [facility]


   Last Admin: 06/01/18 09:09 Dose:  2,000 units


Docusate Sodium (Colace)  100 mg PO DAILY [facility]


   Last Admin: 06/01/18 09:08 Dose:  100 mg


Finasteride (Proscar)  5 mg PO DAILY [facility]


   Last Admin: 06/01/18 09:08 Dose:  5 mg


Furosemide (Lasix)  20 mg PO BIDDIURETIC [facility]


   Last Admin: 06/01/18 13:58 Dose:  20 mg


Vancomycin HCl 1,000 mg/ (Sodium Chloride)  250 mls @ 167 mls/hr IV Q12H [facility]


Metoprolol Succinate (Toprol Xl)  12.5 mg PO DAILY [facility]


   Last Admin: 06/01/18 10:18 Dose:  12.5 mg


Ondansetron HCl (Zofran Odt)  4 mg PO Q4H PRN


   PRN Reason: nausea, able to take PO


Potassium Chloride (Klor-Con M20)  20 meq PO DAILY [facility]


   Last Admin: 06/01/18 09:08 Dose:  20 meq


Sodium Chloride (Saline Flush)  10 ml FLUSH ASDIRECTED PRN


   PRN Reason: Keep Vein Open


   Last Admin: 06/01/18 10:24 Dose:  10 ml


Tamsulosin HCl (Flomax)  0.8 mg PO BEDTIME [facility]


   Last Admin: 05/31/18 20:35 Dose:  0.8 mg


Trimethoprim/Sulfamethoxazole (Septra Ds)  1 tab PO BID [facility]





Discontinued Medications





Acetaminophen (Tylenol)  650 mg PO Q4H PRN


   PRN Reason: Pain (Mild 1-3)/fever


   Last Admin: 05/29/18 20:55 Dose:  650 mg


Apixaban (Eliquis)  2.5 mg PO BID [facility]


   Last Admin: 05/29/18 22:28 Dose:  2.5 mg


Cefazolin Sodium (Ancef)  1 gm IVPUSH Q12H [facility]


   Last Admin: 06/01/18 10:25 Dose:  1 gm


Furosemide (Lasix)  40 mg IVPUSH BIDDIURETIC [facility]


   Last Admin: 05/31/18 08:17 Dose:  40 mg


Cefazolin Sodium/Dextrose 2 gm (/ Premix)  50 mls @ 100 mls/hr IV Q8H [facility]


   Last Admin: 05/30/18 09:49 Dose:  100 mls/hr


Vancomycin HCl 1,000 mg/Vancomycin HCl 500 mg/ Sodium Chloride  500 mls @ 

333.333 mls/hr IV ONETIME ONE


   Stop: 05/29/18 20:29


   Last Admin: 05/29/18 20:45 Dose:  333.333 mls/hr


Vancomycin HCl 500 mg/Vancomycin HCl 750 mg/ Sodium Chloride  250 mls @ 167 mls/

hr IV Q24H [facility]


   Last Admin: 05/31/18 20:29 Dose:  167 mls/hr


Metolazone (Zaroxolyn)  5 mg PO ONETIME ONE


   Stop: 05/30/18 08:07


   Last Admin: 05/30/18 09:35 Dose:  5 mg


Metoprolol Succinate (Toprol Xl)  25 mg PO DAILY [facility]


   Last Admin: 06/01/18 09:58 Dose:  Not Given











- Exam


General: Alert, Oriented, Cooperative, No Acute Distress


HEENT: Pupils Equal, Pupils Reactive


Neck: Supple


Lungs: Clear to Auscultation, Normal Respiratory Effort


Cardiovascular: Regular Rate, Regular Rhythm, No Murmurs


GI/Abdominal Exam: Normal Bowel Sounds, Soft, Non-Tender, No Distention


Back Exam: Normal Inspection


Extremities: Other (Left LE has trace edema. Right is about 2x larger, still 

erythematous.  Per patient much improved, and weeping through dressing has 

ceased.  Dressing not removed at this time.)


Psy/Mental Status: Alert, Normal Affect, Normal Mood





- Problem List & Annotations


(1) Cellulitis


SNOMED Code(s): 326456555


   Code(s): L03.90 - CELLULITIS, UNSPECIFIED   Status: Acute   Current Visit: 

Yes   


Qualifiers: 


   Site of cellulitis: extremity   Laterality: right 


Annotation/Comment:: Improved per patient report. I am going to change the 

patient to by mouth Keflex and Bactrim DS tonight and hopefully discharge home 

on the same tomorrow.   





(2) Afib


SNOMED Code(s): 42229453


   Code(s): I48.91 - UNSPECIFIED ATRIAL FIBRILLATION   Status: Acute   Current 

Visit: Yes   


Qualifiers: 


   Atrial fibrillation type: persistent   Qualified Code(s): I48.1 - Persistent 

atrial fibrillation   


Annotation/Comment:: Currently slightly overly rate control and blood pressure 

was quite low this morning. I cut his beta blocker in half and will discharge 

him on 12.5 mg daily if he tolerates this until he is seen in follow-up.   





(3) CHF (congestive heart failure)


SNOMED Code(s): 95961106


   Code(s): I50.9 - HEART FAILURE, UNSPECIFIED   Status: Acute   Current Visit: 

Yes   


Qualifiers: 


   Heart failure type: systolic 


Annotation/Comment:: Currently on Lasix 20 twice a day. Continue to monitor 

renal function. Appears stable. No shortness of breath.   





(4) HTN (hypertension)


SNOMED Code(s): 83243109


   Code(s): I10 - ESSENTIAL (PRIMARY) HYPERTENSION   Status: Acute   Current 

Visit: Yes   


Qualifiers: 


   Hypertension type: essential hypertension   Qualified Code(s): I10 - 

Essential (primary) hypertension   


Annotation/Comment:: As above, hypotensive today. Patient has been aggressively 

diuresed and I am going to decrease his beta blocker slightly. This will likely 

need to be increased at a later date.   





(5) ANNABELLA (obstructive sleep apnea)


SNOMED Code(s): 64731458


   Code(s): G47.33 - OBSTRUCTIVE SLEEP APNEA (ADULT) (PEDIATRIC)   Status: 

Acute   Current Visit: Yes   Annotation/Comment:: Continue treatment with home 

machine.   





(6) CKD (chronic kidney disease)


SNOMED Code(s): 106570414


   Code(s): N18.9 - CHRONIC KIDNEY DISEASE, UNSPECIFIED   Status: Chronic   

Current Visit: Yes   


Qualifiers: 


   Chronic kidney disease stage: stage 3 (moderate)   Qualified Code(s): N18.3 

- Chronic kidney disease, stage 3 (moderate)   


Annotation/Comment:: Creatinine is still quite a bit higher than his 1.5 

baseline. However improved to 1.9 today.   





(7) PAD (peripheral artery disease)


SNOMED Code(s): 008983540, 005074924


   Code(s): I73.9 - PERIPHERAL VASCULAR DISEASE, UNSPECIFIED   Status: Chronic 

  Current Visit: Yes   Annotation/Comment:: Stable. Monitor.   





- Problem List Review


Problem List Initiated/Reviewed/Updated: Yes





- My Orders


Last 24 Hours: 


My Active Orders





06/01/18 10:00


Metoprolol Succinate [Toprol XL]   12.5 mg PO DAILY 





06/01/18 21:00


Cephalexin [Keflex]   500 mg PO TID 


Sulfamethoxazole/Trimethoprim [Septra DS]   1 tab PO BID

## 2018-06-02 RX ADMIN — VITAMIN D, TAB 1000IU (100/BT) SCH UNITS: 25 TAB at 08:34

## 2018-06-02 RX ADMIN — POTASSIUM CHLORIDE SCH MEQ: 1500 TABLET, EXTENDED RELEASE ORAL at 08:33

## 2018-06-02 NOTE — PCM.DCSUM1
**Discharge Summary





- Hospital Course


Free Text/Narrative:: 


Date of admission: 5/29/18


Date of discharge: 6/2/18





Admission diagnosis: Right lower extremity cellulitis, congestive heart failure 

systolic dysfunction with acute exacerbation on chronic failure.


Discharge diagnosis: Same





Consults: None


Procedures: None





History of present illness:


This is an 83-year-old male who about 3 weeks prior to [facility] started to 

notice increasing lower extremity swelling. It was worse on the right than the 

left. He developed weeping blisters on the right leg and it was about twice the 

size of the left. He finally came in on [facility] because he was having 

fevers and chills and his legs were red and the right one was so swollen and 

weeping. He was given oral Bactrim and increased lasix and discharged to follow-

up the following day in the clinic. The next day he was seen in the clinic and 

admitted directly for IV antibiotic therapy because he showed no improvement.





Hospital Course:


Patient did very well throughout hospitalization. Redness, swelling, and 

weeping decreased and at discharge the patient's legs were almost symmetric and 

he had minimal weeping. Edema resolved. No systemic symptoms. Tolerated by 

mouth antibiotics without difficulty.





Discharge instructions:


Patient discharged to home. Follow-up in 2 days at the clinic for recheck on 

this right lower leg. Continue antibiotic therapy orally as an outpatient. We'

ll also need a basic metabolic panel done at some point in the next week or 2 

because of his Lasix change.








- Discharge Data


Discharge Date: 06/02/18


Discharge Disposition: Home, Self-Care 01


Condition: Good





- Discharge Diagnosis/Problem(s)


(1) Cellulitis


SNOMED Code(s): 431358085


   ICD Code: L03.90 - CELLULITIS, UNSPECIFIED   Status: Acute   Current Visit: 

Yes   Problem Details: Continues to improve. Patient will be discharged on oral 

Keflex renally dosed twice a day and Bactrim DS. He artery has the Bactrim at 

home. Told him only to take 5 days worth as he already had 5 days of IV/

inpatient therapy.   


Qualifiers: 


   Site of cellulitis: extremity   Laterality: right 





(2) Afib


SNOMED Code(s): 86453623


   ICD Code: I48.91 - UNSPECIFIED ATRIAL FIBRILLATION   Status: Acute   Current 

Visit: Yes   Problem Details: Beta blocker decrease resulted in rate control 

improvement to low 60s and SBP in 110s range.  D/C home on lower dose.   


Qualifiers: 


   Atrial fibrillation type: persistent   Qualified Code(s): I48.1 - Persistent 

atrial fibrillation   





(3) CHF (congestive heart failure)


SNOMED Code(s): 02056044


   ICD Code: I50.9 - HEART FAILURE, UNSPECIFIED   Status: Acute   Current Visit

: Yes   Problem Details: Currently on Lasix 20 twice a day. Continue to monitor 

renal function. Appears stable. No shortness of breath.   


Qualifiers: 


   Heart failure type: systolic 





(4) HTN (hypertension)


SNOMED Code(s): 50989114


   ICD Code: I10 - ESSENTIAL (PRIMARY) HYPERTENSION   Status: Acute   Current 

Visit: Yes   Problem Details: As above.   


Qualifiers: 


   Hypertension type: essential hypertension   Qualified Code(s): I10 - 

Essential (primary) hypertension   





(5) ANNABELLA (obstructive sleep apnea)


SNOMED Code(s): 28512303


   ICD Code: G47.33 - OBSTRUCTIVE SLEEP APNEA (ADULT) (PEDIATRIC)   Status: 

Acute   Current Visit: Yes   Problem Details: Continue treatment with home 

machine.   





(6) CKD (chronic kidney disease)


SNOMED Code(s): 855902153


   ICD Code: N18.9 - CHRONIC KIDNEY DISEASE, UNSPECIFIED   Status: Chronic   

Current Visit: Yes   Problem Details: Continues to improve.   


Qualifiers: 


   Chronic kidney disease stage: stage 3 (moderate)   Qualified Code(s): N18.3 

- Chronic kidney disease, stage 3 (moderate)   





(7) PAD (peripheral artery disease)


SNOMED Code(s): 174169317, 546898916


   ICD Code: I73.9 - PERIPHERAL VASCULAR DISEASE, UNSPECIFIED   Status: Chronic

   Current Visit: Yes   Problem Details: Stable. Monitor.   





- Patient Instructions


Diet: Heart Healthy Diet, Low Sodium


Other/Special Instructions: You were admitted to the hospital because you had a 

right leg infection and you had fluid overload. We made some changes to your 

medications. You will need 5 more days of antibiotic therapy with 2 

antibiotics. You will take these both morning and night. I decreased your 

metoprolol dose because your heart rate and blood pressure were low. Please 

continue to cut this in half until seen by your primary doctor. You may need to 

increase to a full pill again in the future. We changed your water pill to a 

lower dose twice daily. Please continue this until seen by your doctor. You may 

cut your current pills in half.  Please see your regular doctor or one of the 

partners at your usual clinic on Monday for recheck on your wound.





- Discharge Plan


Prescriptions/Med Rec: 


Cephalexin [Keflex] 500 mg PO BID #10 cap


Furosemide [Lasix] 20 mg PO BIDDIURETIC #60 tablet


Metoprolol Succinate [Toprol XL] 12.5 mg PO DAILY #30 tab.er


Potassium Chloride [Klor-Con M20] 20 meq PO DAILY #30 tab.er


Home Medications: 


 Home Meds





Acetaminophen [Tylenol] 325 mg PO Q4H PRN 05/28/18 [History]


Apixaban [Eliquis] 2.5 mg PO BID 05/28/18 [History]


Aspirin 81 mg PO DAILY 05/28/18 [History]


Docusate Sodium [Colace] 100 mg PO DAILY 05/28/18 [History]


Finasteride [Proscar] 5 mg PO DAILY 05/28/18 [History]


Tamsulosin [Flomax] 0.8 mg PO BEDTIME 05/28/18 [History]


Cholecalciferol (Vitamin D3) [Vitamin D3] 1,000 units PO DAILY 05/30/18 [History

]


Cephalexin [Keflex] 500 mg PO BID #10 cap 06/02/18 [Rx]


Furosemide [Lasix] 20 mg PO BIDDIURETIC #60 tablet 06/02/18 [Rx]


Metoprolol Succinate [Toprol XL] 12.5 mg PO DAILY #30 tab.er 06/02/18 [Rx]


Potassium Chloride [Klor-Con M20] 20 meq PO DAILY #30 tab.er 06/02/18 [Rx]


Sulfamethoxazole/Trimethoprim [Septra DS] 1 tab PO BID  tablet 06/02/18 [Rx]








Patient Handouts:  Heart Failure, Easy-to-Read, Fall Prevention in Hospitals, 

Adult, Venous Thromboembolism Prevention





- Discharge Summary/Plan Comment


DC Time >30 min.: Yes





- General Info


Date of Service: 06/02/18


Subjective Update: 


On the day of discharge patient was doing well. He had no chest pain, no 

shortness of breath, no nausea, no vomiting, no diarrhea. His leg pain was much 

improved although he still had pain with ambulation particularly in the ankle.








- Patient Data


Vitals - Most Recent: 


 Last Vital Signs











Temp  36.6 C   06/02/18 08:05


 


Pulse  62   06/02/18 08:33


 


Resp  18   06/02/18 08:05


 


BP  109/58 L  06/02/18 08:33


 


Pulse Ox  96   06/02/18 08:05











Weight - Most Recent: 88.541 kg


I&O - Last 24 hours: 


 Intake & Output











 06/01/18 06/02/18 06/02/18





 22:59 06:59 14:59


 


Intake Total 500 250 


 


Output Total 900 625 


 


Balance -400 -375 











Lab Results - Last 24 hrs: 


 Laboratory Results - last 24 hr











  06/02/18 Range/Units





  06:40 


 


Sodium  139  (135-145)  mmol/L


 


Potassium  3.3 L  (3.5-5.3)  mmol/L


 


Chloride  101  (100-110)  mmol/L


 


Carbon Dioxide  30  (21-32)  mmol/L


 


BUN  48 H  (7-18)  mg/dL


 


Creatinine  1.7 H  (0.70-1.30)  mg/dL


 


Est Cr Clr Drug Dosing  38.95  mL/min


 


Estimated GFR (MDRD)  39 L  (>60)  


 


BUN/Creatinine Ratio  28.2 H  (9-20)  


 


Glucose  107  ()  mg/dL


 


Calcium  8.9  (8.6-10.2)  mg/dL











YASMINE Results - Last 24 hrs: 


 Microbiology











 05/29/18 18:35 Aerobic Blood Culture - Preliminary





 Blood - Venous - Lab Draw    NO GROWTH AFTER 3 DAYS





 Anaerobic Blood Culture - Preliminary





    NO GROWTH AFTER 3 DAYS


 


 05/29/18 18:25 Aerobic Blood Culture - Preliminary





 Blood - Venous    NO GROWTH AFTER 3 DAYS





 Anaerobic Blood Culture - Preliminary





    NO GROWTH AFTER 3 DAYS











Med Orders - Current: 


 Current Medications





Acetaminophen (Tylenol)  325 mg PO Q4H PRN


   PRN Reason: Pain


   Last Admin: 06/02/18 08:59 Dose:  325 mg


Apixaban (Eliquis)  2.5 mg PO BID [facility]


   Last Admin: 06/02/18 08:31 Dose:  2.5 mg


Aspirin (Aspirin)  81 mg PO DAILY [facility]


   Last Admin: 06/02/18 08:31 Dose:  81 mg


Cephalexin (Keflex)  500 mg PO BID [facility]


   Last Admin: 06/02/18 08:36 Dose:  500 mg


Cholecalciferol (Vitamin D3)  2,000 units PO DAILY [facility]


   Last Admin: 06/02/18 08:34 Dose:  2,000 units


Docusate Sodium (Colace)  100 mg PO DAILY [facility]


   Last Admin: 06/02/18 08:31 Dose:  100 mg


Finasteride (Proscar)  5 mg PO DAILY [facility]


   Last Admin: 06/02/18 08:33 Dose:  5 mg


Furosemide (Lasix)  20 mg PO BIDDIURETIC [facility]


   Last Admin: 06/02/18 08:30 Dose:  20 mg


Metoprolol Succinate (Toprol Xl)  12.5 mg PO DAILY [facility]


   Last Admin: 06/02/18 08:33 Dose:  12.5 mg


Ondansetron HCl (Zofran Odt)  4 mg PO Q4H PRN


   PRN Reason: nausea, able to take PO


Potassium Chloride (Klor-Con M20)  20 meq PO DAILY [facility]


   Last Admin: 06/02/18 08:33 Dose:  20 meq


Sodium Chloride (Saline Flush)  10 ml FLUSH ASDIRECTED PRN


   PRN Reason: Keep Vein Open


   Last Admin: 06/01/18 10:24 Dose:  10 ml


Tamsulosin HCl (Flomax)  0.8 mg PO BEDTIME [facility]


   Last Admin: 06/01/18 20:54 Dose:  0.8 mg


Trimethoprim/Sulfamethoxazole (Septra Ds)  1 tab PO BID [facility]


   Last Admin: 06/02/18 08:36 Dose:  1 tab





Discontinued Medications





Acetaminophen (Tylenol)  650 mg PO Q4H PRN


   PRN Reason: Pain (Mild 1-3)/fever


   Last Admin: 05/29/18 20:55 Dose:  650 mg


Apixaban (Eliquis)  2.5 mg PO BID [facility]


   Last Admin: 05/29/18 22:28 Dose:  2.5 mg


Cefazolin Sodium (Ancef)  1 gm IVPUSH Q12H [facility]


   Last Admin: 06/01/18 10:25 Dose:  1 gm


Furosemide (Lasix)  40 mg IVPUSH BIDDIURETIC [facility]


   Last Admin: 05/31/18 08:17 Dose:  40 mg


Cefazolin Sodium/Dextrose 2 gm (/ Premix)  50 mls @ 100 mls/hr IV Q8H [facility]


   Last Admin: 05/30/18 09:49 Dose:  100 mls/hr


Vancomycin HCl 1,000 mg/ (Sodium Chloride)  250 mls @ 167 mls/hr IV Q12H [facility]


   Last Admin: 06/01/18 17:17 Dose:  Not Given


Vancomycin HCl 1,000 mg/Vancomycin HCl 500 mg/ Sodium Chloride  500 mls @ 

333.333 mls/hr IV ONETIME ONE


   Stop: 05/29/18 20:29


   Last Admin: 05/29/18 20:45 Dose:  333.333 mls/hr


Vancomycin HCl 500 mg/Vancomycin HCl 750 mg/ Sodium Chloride  250 mls @ 167 mls/

hr IV Q24H [facility]


   Last Admin: 05/31/18 20:29 Dose:  167 mls/hr


Metolazone (Zaroxolyn)  5 mg PO ONETIME ONE


   Stop: 05/30/18 08:07


   Last Admin: 05/30/18 09:35 Dose:  5 mg


Metoprolol Succinate (Toprol Xl)  25 mg PO DAILY [facility]


   Last Admin: 06/01/18 09:58 Dose:  Not Given











- Exam


General: Reports: Alert, Oriented, Cooperative, No Acute Distress


HEENT: Reports: Pupils Equal, Pupils Reactive


Neck: Reports: Supple


Lungs: Reports: Clear to Auscultation, Normal Respiratory Effort


Cardiovascular: Reports: Regular Rate, No Murmurs, Irregular Rhythm


GI/Abdominal Exam: Normal Bowel Sounds, Soft, Non-Tender


Extremities: Other (On exam of the lower extremities, both feet are plethoric. 

No edema. The right still slightly larger than the left. One small area still 

weeping on the outer right shin but significantly improved.  to 

palpation and erythematous to about the knee.)


Psy/Mental Status: Reports: Alert, Normal Affect, Normal Mood

## 2018-07-01 ENCOUNTER — HOSPITAL ENCOUNTER (EMERGENCY)
Dept: HOSPITAL 7 - FB.ED | Age: 83
Discharge: ANOTHER HOSPITAL | End: 2018-07-01
Payer: MEDICARE

## 2018-07-01 DIAGNOSIS — I25.810: ICD-10-CM

## 2018-07-01 DIAGNOSIS — Z95.1: ICD-10-CM

## 2018-07-01 DIAGNOSIS — I73.9: ICD-10-CM

## 2018-07-01 DIAGNOSIS — Z79.82: ICD-10-CM

## 2018-07-01 DIAGNOSIS — I49.3: Primary | ICD-10-CM

## 2018-07-01 DIAGNOSIS — L03.116: ICD-10-CM

## 2018-07-01 DIAGNOSIS — I11.0: ICD-10-CM

## 2018-07-01 DIAGNOSIS — L03.115: ICD-10-CM

## 2018-07-01 DIAGNOSIS — Z86.73: ICD-10-CM

## 2018-07-01 DIAGNOSIS — Z79.899: ICD-10-CM

## 2018-07-01 NOTE — EDM.PDOC
ED HPI GENERAL MEDICAL PROBLEM





- General


Chief Complaint: Lower Extremity Injury/Pain


Stated Complaint: PAIN IN LEG


Time Seen by Provider: 07/01/18 08:04


Source of Information: Reports: Patient


History Limitations: Reports: Physical Impairment





- History of Present Illness


INITIAL COMMENTS - FREE TEXT/NARRATIVE: 


83 y.o.w.m with multiple medical issues including PVD, CRI, drove himself to 

the ED due to lower leg pain. He could not walk from the Parking lot to the ED 

because of pain at his right foot. Pt was seen yesterday by his PMD who 

increased his lasix dosage. On arrival to the ED, pt C/O poorly healing wounds 

at his left and right lower leg and pain at his right foot. The symptoms 

started a few days ago, getting worse. Pt was seen by a vascular surgeon about 

a year ago at [facility], when a "vessel was opened up" and his wound healing 

improved at that time. There was no pulse palpated and by doppler US at his 

right dorsali pedis and posterior tibial artery. CAP refill was 2 sec. /

67 pulse 80 RR 20 Pulse ox 98% on RA temp. 37


Onset Date: 06/28/18


Onset Time: 19:00


Duration: Day(s):, Getting Worse, Waxing/Waning


Location: Reports: Lower Extremity, Left, Lower Extremity, Right


Quality: Reports: Ache, Dull, Pressure, Throbbing


Improves with: Reports: Rest


Worsens with: Reports: Movement


Context: Reports: Other (PVD)


Associated Symptoms: Reports: Weakness


  ** both lower legs


Pain Score (Numeric/FACES): 6





- Related Data


 Allergies











Allergy/AdvReac Type Severity Reaction Status Date / Time


 


No Known Allergies Allergy   Verified 07/01/18 08:23











Home Meds: 


 Home Meds





Acetaminophen [Tylenol] 325 mg PO Q4H PRN 05/28/18 [History]


Apixaban [Eliquis] 2.5 mg PO BID 05/28/18 [History]


Aspirin 81 mg PO DAILY 05/28/18 [History]


Docusate Sodium [Colace] 100 mg PO DAILY 05/28/18 [History]


Finasteride [Proscar] 5 mg PO DAILY 05/28/18 [History]


Tamsulosin [Flomax] 0.8 mg PO BEDTIME 05/28/18 [History]


Cholecalciferol (Vitamin D3) [Vitamin D3] 1,000 units PO DAILY 05/30/18 [History

]


Metoprolol Succinate [Toprol XL] 12.5 mg PO DAILY #30 tab.er 06/02/18 [Rx]


Potassium Chloride [Klor-Con M20] 20 meq PO DAILY #30 tab.er 06/02/18 [Rx]


Furosemide [Lasix] 40 mg PO BIDDIURETIC 07/01/18 [History]











Past Medical History


HEENT History: Reports: Hard of Hearing, Impaired Vision


Cardiovascular History: Reports: Afib, Arrhythmia, Bypass, Heart Failure, SOB 

on Exertion


Respiratory History: Reports: SOB


Gastrointestinal History: Reports: None


Genitourinary History: Reports: None


Musculoskeletal History: Reports: None


Neurological History: Reports: CVA


Other Neuro History: April 22, 2018-stroke


Psychiatric History: Reports: None


Endocrine/Metabolic History: Reports: None


Hematologic History: Reports: None


Immunologic History: Reports: None


Oncologic (Cancer) History: Reports: None


Dermatologic History: Reports: Cellulitis





- Infectious Disease History


Infectious Disease History: Reports: Chicken Pox, Measles





- Past Surgical History


Head Surgeries/Procedures: Reports: None


HEENT Surgical History: Reports: Cataract Surgery


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


Respiratory Surgical History: Reports: None


GI Surgical History: Reports: Colonoscopy


Male  Surgical History: Reports: Other (See Below)


Other Male  Surgeries/Procedures: some prostate problems and trouble with 

emepting bladder


Endocrine Surgical History: Reports: None


Neurological Surgical History: Reports: None


Musculoskeletal Surgical History: Reports: None


Oncologic Surgical History: Reports: None


Dermatological Surgical History: Reports: None





Social & Family History





- Family History


Family Medical History: Unobtainable





- Caffeine Use


Caffeine Use: Reports: Coffee





Review of Systems





- Review of Systems


Review Of Systems: See Below


Constitutional: Reports: Weakness


Eyes: Reports: No Symptoms


Ears: Reports: No Symptoms


Nose: Reports: No Symptoms


Mouth/Throat: Reports: No Symptoms


Respiratory: Reports: No Symptoms


Cardiovascular: Reports: No Symptoms


GI/Abdominal: Reports: No Symptoms


Genitourinary: Reports: No Symptoms


Musculoskeletal: Reports: Leg Pain, Foot Pain, Muscle Pain


Skin: Reports: Wound (Left and right lower legs)


Neurological: Reports: Weakness


Psychiatric: Reports: No Symptoms





ED EXAM, GENERAL





- Physical Exam


Exam: See Below


Exam Limited By: Physical Impairment


General Appearance: Alert, Mild Distress, Cachetic


Eye Exam: Bilateral Eye: Normal Inspection


Ears: Normal External Exam


Ear Exam: Bilateral Ear: Auricle Normal


Nose: Normal Inspection, Normal Mucosa


Throat/Mouth: Normal Lips, Normal Voice, No Airway Compromise, Other (poor 

dentition, dry mucosal membranes)


Head: Atraumatic, Normocephalic


Neck: Normal Inspection, Supple, Non-Tender, Full Range of Motion


Respiratory/Chest: No Respiratory Distress, Lungs Clear, Normal Breath Sounds


Cardiovascular: Bradycardia


Peripheral Pulses: 0: Posterior Tibial (R), Dorsalis Pedis (R), 1+: Posterior 

Tibial (L), Dorsalis Pedis (L)


GI/Abdominal: Normal Bowel Sounds, Soft, Non-Tender, No Organomegaly, No 

Abnormal Bruit


 (Male) Exam: Deferred


Rectal (Males) Exam: Deferred


Back Exam: Normal Inspection, Full Range of Motion


Extremities: Pedal Edema (bilat), Limited Range of Motion


Neurological: Alert, Oriented, CN II-XII Intact, Normal Cognition, Abnormal 

Gait (due to pain R foot)


Psychiatric: Normal Affect, Normal Mood


Skin Exam: Erythema, Rash, Wound/Incision (poor healing wouns right and left 

lower legs)


Lymphatic: No Adenopathy





EKG INTERPRETATION


EKG Date: 07/01/18


Time: 12:10


Rhythm: A-Fib


Axis: Other (LPFB)


P-Wave: Absent


QRS: RBBB


ST-T: Normal


QT: Prolonged


Comparison: NA - No Prior EKG


EKG Interpretation Comments: 





low voltage ECG





Course





- Vital Signs


Text/Narrative:: 


83 y.o.w.m with multiple medical issues including PVD, CRI, drove himself to 

the ED due to lower leg pain. He could not walk from the Parking lot to the ED 

because of pain at his right foot. Pt was seen yesterday by his PMD who 

increased his lasix dosage. On arrival to the ED, pt C/O poorly healing wounds 

at his left and right lower leg and pain at his right foot. The symptoms 

started a few days ago, getting worse. Pt was seen by a vascular surgeon about 

a year ago at [place], when a "vessel was opened up" and his wound healing 

improved at that time. There was no pulse palpated and by doppler US at his 

right dorsali pedis and posterior tibial artery. CAP refill was 2 sec. /

67 pulse 80 RR 20 Pulse ox 98% on RA temp. 37  


PE: 83 y.o.w.m with poor lower PVD poor healing ulcers, no pulse right distal 

lower extremity. Pt takes ASA Daily (not on Coumadine)


Labs: WBC 14.4 HGB 13, HCT 48.3 INR 1.37 Na 137 K 3.8 GFR 34 BUN 38 Cr 1.9 

Blood Cx results are pending


Imaging: Not indicated at this time


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 

no pulse r post tib. artery, no pulse right dorsalis pedis CRI, H/O HTN. Bilt 

lower extremity cellulitis. Multiple medical issues. 


9.49 am Consultation: Dr. Ramirez/Rhonda  Vascular surgeon/hospitalist, 

[facility]: Accepted the pt for admission and further care.


Tx: pt refused pain meds.


Plan: Transfer to [place].


Last Recorded V/S: 


 Last Vital Signs











Temp  36.7 C   07/01/18 10:41


 


Pulse  59 L  07/01/18 10:41


 


Resp  20   07/01/18 10:41


 


BP  106/81   07/01/18 10:41


 


Pulse Ox  100   07/01/18 10:41














- Orders/Labs/Meds


Orders: 


 Active Orders 24 hr











 Category Date Time Status


 


 CULTURE BLOOD [BC] Urgent Lab  07/01/18 08:50 Received


 


 CULTURE BLOOD [BC] Urgent Lab  07/01/18 08:55 Received


 


 Blood Culture x2 Reflex Set [OM.PC] Urgent Oth  07/01/18 08:36 Ordered


 


 EKG 12 Lead [EK] Routine Ther  07/01/18 10:08 Ordered











Labs: 


 Laboratory Tests











  07/01/18 07/01/18 07/01/18 Range/Units





  08:50 08:50 08:50 


 


WBC  14.3 H    (4.5-12.0)  X10-3/uL


 


RBC  4.26 L    (4.30-5.75)  x10(6)uL


 


Hgb  13.8    (11.5-15.5)  g/dL


 


Hct  41.2    (30.0-51.3)  %


 


MCV  96.7 H    (80-96)  fL


 


MCH  32.5    (27.7-33.6)  pg


 


MCHC  33.6    (32.2-35.4)  g/dL


 


RDW  13.7    (11.5-15.5)  %


 


Plt Count  104 L    (125-369)  X10(3)uL


 


MPV  10.8 H    (7.4-10.4)  fL


 


Add Manual Diff  Yes    


 


Neutrophils % (Manual)  91 H    (46-82)  %


 


Band Neutrophils %  1    (0-6)  %


 


Lymphocytes % (Manual)  5 L    (13-37)  %


 


Monocytes % (Manual)  3 L    (4-12)  %


 


PT   13.3 H   (8.7-11.1)  


 


INR   1.37 H   (0.89-1.13)  


 


Sodium    137  (135-145)  mmol/L


 


Potassium    3.8  (3.5-5.3)  mmol/L


 


Chloride    100  (100-110)  mmol/L


 


Carbon Dioxide    32  (21-32)  mmol/L


 


BUN    38 H D  (7-18)  mg/dL


 


Creatinine    1.9 H  (0.70-1.30)  mg/dL


 


Est Cr Clr Drug Dosing    34.85  mL/min


 


Estimated GFR (MDRD)    34 L  (>60)  


 


BUN/Creatinine Ratio    20.0  (9-20)  


 


Glucose    103  ()  mg/dL


 


Calcium    8.6  (8.6-10.2)  mg/dL














Departure





- Departure


Time of Disposition: 10:24


Disposition: DC/Tfer to [facility]


Condition: Fair


Clinical Impression: 


 PVD (peripheral vascular disease) with claudication








- Discharge Information


Referrals: 


Lee Escalante MD [Primary Care Provider] - 


Forms:  ED Department Discharge





- My Orders


Last 24 Hours: 


My Active Orders





07/01/18 08:36


Blood Culture x2 Reflex Set [OM.PC] Urgent 





07/01/18 08:50


CULTURE BLOOD [BC] Urgent 





07/01/18 08:55


CULTURE BLOOD [BC] Urgent 





07/01/18 10:08


EKG 12 Lead [EK] Routine 














- Assessment/Plan


Last 24 Hours: 


My Active Orders





07/01/18 08:36


Blood Culture x2 Reflex Set [OM.PC] Urgent 





07/01/18 08:50


CULTURE BLOOD [BC] Urgent 





07/01/18 08:55


CULTURE BLOOD [BC] Urgent 





07/01/18 10:08


EKG 12 Lead [EK] Routine

## 2019-12-06 ENCOUNTER — HOSPITAL ENCOUNTER (EMERGENCY)
Dept: HOSPITAL 7 - FB.ED | Age: 84
Discharge: NURSING, REHAB OR LONG-TERM CARE | End: 2019-12-06
Payer: MEDICARE

## 2019-12-06 DIAGNOSIS — I48.19: ICD-10-CM

## 2019-12-06 DIAGNOSIS — Z79.82: ICD-10-CM

## 2019-12-06 DIAGNOSIS — I50.20: Primary | ICD-10-CM

## 2019-12-06 DIAGNOSIS — Z79.899: ICD-10-CM

## 2019-12-06 DIAGNOSIS — Z79.01: ICD-10-CM

## 2019-12-06 DIAGNOSIS — Z86.73: ICD-10-CM

## 2019-12-06 NOTE — EDM.PDOC
ED HPI GENERAL MEDICAL PROBLEM





- General


Chief Complaint: Cardiovascular Problem


Stated Complaint: weakness,dizziness


Time Seen by Provider: 12/06/19 10:55


Source of Information: Reports: Patient


History Limitations: Reports: No Limitations





- History of Present Illness


INITIAL COMMENTS - FREE TEXT/NARRATIVE: 





Patient presented to the ED because of generalized body weakness,increasing 

dyspnea and cough for 1 week. The cough is mostly non-productive, denies having 

any fever or chills. There is no chest pain,N/V. He was having an 

echocardiogram and was found to be bradycardic with HR of 40's with near 

syncopal episode.





- Related Data


 Allergies











Allergy/AdvReac Type Severity Reaction Status Date / Time


 


No Known Allergies Allergy   Verified 07/01/18 08:23











Home Meds: 


 Home Meds





Acetaminophen [Tylenol] 325 mg PO Q4H PRN 05/28/18 [History]


Apixaban [Eliquis] 2.5 mg PO BID 05/28/18 [History]


Aspirin 81 mg PO DAILY 05/28/18 [History]


Docusate Sodium [Colace] 100 mg PO DAILY 05/28/18 [History]


Finasteride [Proscar] 5 mg PO DAILY 05/28/18 [History]


Tamsulosin [Flomax] 0.8 mg PO BEDTIME 05/28/18 [History]


Cholecalciferol (Vitamin D3) [Vitamin D3] 1,000 units PO DAILY 05/30/18 [History

]


Metoprolol Succinate [Toprol XL] 12.5 mg PO DAILY #30 tab.er 06/02/18 [Rx]


Potassium Chloride [Klor-Con M20] 20 meq PO DAILY #30 tab.er 06/02/18 [Rx]


Furosemide [Lasix] 40 mg PO BIDDIURETIC 07/01/18 [History]











Past Medical History


HEENT History: Reports: Hard of Hearing, Impaired Vision


Cardiovascular History: Reports: Afib, Arrhythmia, Bypass, Heart Failure, SOB 

on Exertion


Respiratory History: Reports: SOB


Gastrointestinal History: Reports: None


Genitourinary History: Reports: None


Musculoskeletal History: Reports: None


Neurological History: Reports: CVA


Other Neuro History: April 22, 2018-stroke


Psychiatric History: Reports: None


Endocrine/Metabolic History: Reports: None


Hematologic History: Reports: None


Immunologic History: Reports: None


Oncologic (Cancer) History: Reports: None


Dermatologic History: Reports: Cellulitis





- Infectious Disease History


Infectious Disease History: Reports: Chicken Pox, Measles





- Past Surgical History


Head Surgeries/Procedures: Reports: None


HEENT Surgical History: Reports: Cataract Surgery


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


Respiratory Surgical History: Reports: None


GI Surgical History: Reports: Colonoscopy


Male  Surgical History: Reports: Other (See Below)


Other Male  Surgeries/Procedures: some prostate problems and trouble with 

emepting bladder


Endocrine Surgical History: Reports: None


Neurological Surgical History: Reports: None


Musculoskeletal Surgical History: Reports: None


Oncologic Surgical History: Reports: None


Dermatological Surgical History: Reports: None





Social & Family History





- Family History


Family Medical History: Unobtainable





- Tobacco Use


Smoking Status *Q: Never Smoker





- Caffeine Use


Caffeine Use: Reports: Coffee





ED ROS GENERAL





- Review of Systems


Review Of Systems: See Below


Constitutional: Reports: No Symptoms


HEENT: Reports: No Symptoms


Respiratory: Reports: Shortness of Breath, Wheezing, Cough.  Denies: Pleuritic 

Chest Pain, Sputum


Cardiovascular: Reports: No Symptoms


Endocrine: Reports: No Symptoms


GI/Abdominal: Reports: No Symptoms


: Reports: No Symptoms


Musculoskeletal: Reports: No Symptoms


Skin: Reports: No Symptoms


Neurological: Reports: No Symptoms


Psychiatric: Reports: No Symptoms





ED EXAM, GENERAL





- Physical Exam


Exam: See Below


Exam Limited By: No Limitations


General Appearance: Alert, WD/WN, No Apparent Distress


Nose: Normal Inspection, Normal Mucosa


Throat/Mouth: Normal Inspection, Normal Lips, Normal Teeth, Normal Gums, Normal 

Oropharynx


Head: Atraumatic, Normocephalic


Neck: Normal Inspection, Supple, Non-Tender, Full Range of Motion


Respiratory/Chest: Rhonchi, Wheezing, Other (decrease BS-bilateral).  No: 

Crackles


Cardiovascular: Normal Peripheral Pulses, No Edema, No Gallop, No JVD, No Murmur

, No Rub, Irregularly Irregular


GI/Abdominal: Normal Bowel Sounds, Soft, Non-Tender, No Organomegaly, No 

Distention, No Abnormal Bruit, No Mass, Pelvis Stable


Back Exam: Normal Inspection, Full Range of Motion


Extremities: Normal Inspection, Normal Range of Motion, Non-Tender, No Pedal 

Edema





Course





- Vital Signs


Text/Narrative:: 





labs,EKG/CXR reviewed with patient and with full understanding


Patient and his daughter want him to be transferred to [place] [id number] with significant relief of his dyspnea











Last Recorded V/S: 





 Last Vital Signs











Temp  36.3 C   12/06/19 10:53


 


Pulse  83   12/06/19 10:53


 


Resp  17   12/06/19 10:53


 


BP  118/46 L  12/06/19 10:53


 


Pulse Ox  90 L  12/06/19 10:53














- Orders/Labs/Meds


Orders: 





 Active Orders 24 hr











 Category Date Time Status


 


 EKG Documentation Completion [RC] ASDIRECTED Care  12/06/19 10:57 Active


 


 RT Aerosol Therapy [RC] ASDIRECTED Care  12/06/19 11:12 Active


 


 Chest 1V Frontal [CR] Stat Exams  12/06/19 11:14 Taken


 


 Sodium Chloride 0.9% [Saline Flush] Med  12/06/19 11:01 Active





 10 ml FLUSH ASDIRECTED PRN   


 


 Saline Lock Insert [OM.PC] Routine Oth  12/06/19 11:01 Ordered


 


 EKG 12 Lead [EK] Routine Ther  12/06/19 10:56 Ordered








 Medication Orders





Sodium Chloride (Saline Flush)  10 ml FLUSH ASDIRECTED PRN


   PRN Reason: Keep Vein Open


   Last Admin: 12/06/19 11:20  Dose: 10 ml








Labs: 





 Laboratory Tests











  12/06/19 12/06/19 12/06/19 Range/Units





  11:10 11:10 11:10 


 


WBC  5.4    (4.5-12.0)  X10-3/uL


 


RBC  4.34    (4.30-5.75)  x10(6)uL


 


Hgb  13.6    (13.5-17.8)  g/dL


 


Hct  40.9    (30.0-51.3)  %


 


MCV  94.3    (80-96)  fL


 


MCH  31.3    (27.7-33.6)  pg


 


MCHC  33.2    (32.2-35.4)  g/dL


 


RDW  13.8    (11.5-15.5)  %


 


Plt Count  120 L    (125-369)  X10(3)uL


 


MPV  10.8 H    (7.4-10.4)  fL


 


Neut % (Auto)  77.5    (46-82)  %


 


Lymph % (Auto)  8.9 L    (13-37)  %


 


Mono % (Auto)  10.7    (4-12)  %


 


Eos % (Auto)  2    (1.0-5.0)  %


 


Baso % (Auto)  1    (0-2)  %


 


Neut # (Auto)  4.2    (1.6-8.3)  #


 


Lymph # (Auto)  0.5 L    (0.6-5.0)  #


 


Mono # (Auto)  0.6    (0.0-1.3)  #


 


Eos # (Auto)  0.1    (0.0-0.8)  #


 


Baso # (Auto)  0.0    (0.0-0.2)  #


 


Sodium     (135-145)  mmol/L


 


Potassium     (3.5-5.3)  mmol/L


 


Chloride     (100-110)  mmol/L


 


Carbon Dioxide     (21-32)  mmol/L


 


BUN     (7-18)  mg/dL


 


Creatinine     (0.70-1.30)  mg/dL


 


Est Cr Clr Drug Dosing     


 


Estimated GFR (MDRD)     (>60)  


 


BUN/Creatinine Ratio     (9-20)  


 


Glucose     ()  mg/dL


 


Calcium     (8.6-10.2)  mg/dL


 


Magnesium    2.7 H  (1.8-2.5)  mg/dL


 


Total Bilirubin     (0.1-1.3)  mg/dL


 


AST     (5-25)  IU/L


 


ALT     (12-36)  U/L


 


Alkaline Phosphatase     ()  IU/L


 


Troponin I   0.033   (<0.017-0.056)  ng/mL


 


NT-Pro-B Natriuret Pep   48485 H*   (<=450)  pg/mL


 


Total Protein     (6.0-8.0)  g/dL


 


Albumin     (3.2-4.6)  g/dL


 


Globulin     g/dL


 


Albumin/Globulin Ratio     














  12/06/19 Range/Units





  11:10 


 


WBC   (4.5-12.0)  X10-3/uL


 


RBC   (4.30-5.75)  x10(6)uL


 


Hgb   (13.5-17.8)  g/dL


 


Hct   (30.0-51.3)  %


 


MCV   (80-96)  fL


 


MCH   (27.7-33.6)  pg


 


MCHC   (32.2-35.4)  g/dL


 


RDW   (11.5-15.5)  %


 


Plt Count   (125-369)  X10(3)uL


 


MPV   (7.4-10.4)  fL


 


Neut % (Auto)   (46-82)  %


 


Lymph % (Auto)   (13-37)  %


 


Mono % (Auto)   (4-12)  %


 


Eos % (Auto)   (1.0-5.0)  %


 


Baso % (Auto)   (0-2)  %


 


Neut # (Auto)   (1.6-8.3)  #


 


Lymph # (Auto)   (0.6-5.0)  #


 


Mono # (Auto)   (0.0-1.3)  #


 


Eos # (Auto)   (0.0-0.8)  #


 


Baso # (Auto)   (0.0-0.2)  #


 


Sodium  142  (135-145)  mmol/L


 


Potassium  3.9  (3.5-5.3)  mmol/L


 


Chloride  103  (100-110)  mmol/L


 


Carbon Dioxide  30  (21-32)  mmol/L


 


BUN  49 H D  (7-18)  mg/dL


 


Creatinine  2.3 H*  (0.70-1.30)  mg/dL


 


Est Cr Clr Drug Dosing  TNP  


 


Estimated GFR (MDRD)  27 L  (>60)  


 


BUN/Creatinine Ratio  21.3 H  (9-20)  


 


Glucose  98  ()  mg/dL


 


Calcium  9.4  (8.6-10.2)  mg/dL


 


Magnesium   (1.8-2.5)  mg/dL


 


Total Bilirubin  1.6 H  (0.1-1.3)  mg/dL


 


AST  37 H D  (5-25)  IU/L


 


ALT  30  D  (12-36)  U/L


 


Alkaline Phosphatase  140 H  ()  IU/L


 


Troponin I   (<0.017-0.056)  ng/mL


 


NT-Pro-B Natriuret Pep   (<=450)  pg/mL


 


Total Protein  7.3  (6.0-8.0)  g/dL


 


Albumin  3.7  (3.2-4.6)  g/dL


 


Globulin  3.6  g/dL


 


Albumin/Globulin Ratio  1.0  











Meds: 





Medications











Generic Name Dose Route Start Last Admin





  Trade Name Freq  PRN Reason Stop Dose Admin


 


Sodium Chloride  10 ml  12/06/19 11:01  12/06/19 11:20





  Saline Flush  FLUSH   10 ml





  ASDIRECTED PRN   Administration





  Keep Vein Open   





     





     





     














Discontinued Medications














Generic Name Dose Route Start Last Admin





  Trade Name Freq  PRN Reason Stop Dose Admin


 


Albuterol/Ipratropium  3 ml  12/06/19 11:12  12/06/19 11:23





  Duoneb 3.0-0.5 Mg/3 Ml  NEB  12/06/19 11:13  3 ml





  ONETIME ONE   Administration





     





     





     





     














Departure





- Departure


Time of Disposition: 12:20


Disposition: DC/[facility]fer to [facility]


Reason for Transfer *Q: Other ([facility] Level of Care-cardiology)


Condition: Good


Clinical Impression: 


CHF (congestive heart failure)


Qualifiers:


 Heart failure type: systolic 





Afib


Qualifiers:


 Atrial fibrillation type: persistent Qualified Code(s): I48.1 - Persistent 

atrial fibrillation





Referrals: 


Lee Escalante MD [Primary Care Provider] - 


Forms:  ED Department Discharge





- My Orders


Last 24 Hours: 





My Active Orders





12/06/19 10:56


EKG 12 Lead [EK] Routine 





12/06/19 10:57


EKG Documentation Completion [RC] ASDIRECTED 





12/06/19 11:01


Sodium Chloride 0.9% [Saline Flush]   10 ml FLUSH ASDIRECTED PRN 


Saline Lock Insert [OM.PC] Routine 





12/06/19 11:12


RT Aerosol Therapy [RC] ASDIRECTED 





12/06/19 11:14


Chest 1V Frontal [CR] Stat 














- Assessment/Plan


Last 24 Hours: 





My Active Orders





12/06/19 10:56


EKG 12 Lead [EK] Routine 





12/06/19 10:57


EKG Documentation Completion [RC] ASDIRECTED 





12/06/19 11:01


Sodium Chloride 0.9% [Saline Flush]   10 ml FLUSH ASDIRECTED PRN 


Saline Lock Insert [OM.PC] Routine 





12/06/19 11:12


RT Aerosol Therapy [RC] ASDIRECTED 





12/06/19 11:14


Chest 1V Frontal [CR] Stat